# Patient Record
Sex: FEMALE | URBAN - METROPOLITAN AREA
[De-identification: names, ages, dates, MRNs, and addresses within clinical notes are randomized per-mention and may not be internally consistent; named-entity substitution may affect disease eponyms.]

---

## 2017-05-22 ENCOUNTER — APPOINTMENT (OUTPATIENT)
Dept: OTHER | Facility: HOSPITAL | Age: 22
End: 2017-05-22
Attending: EMERGENCY MEDICINE

## 2024-08-17 ENCOUNTER — HOSPITAL ENCOUNTER (EMERGENCY)
Facility: HOSPITAL | Age: 29
Discharge: HOME | End: 2024-08-17
Attending: EMERGENCY MEDICINE
Payer: COMMERCIAL

## 2024-08-17 ENCOUNTER — APPOINTMENT (OUTPATIENT)
Dept: GENERAL RADIOLOGY | Facility: HOSPITAL | Age: 29
End: 2024-08-17
Attending: EMERGENCY MEDICINE
Payer: COMMERCIAL

## 2024-08-17 VITALS
WEIGHT: 200 LBS | DIASTOLIC BLOOD PRESSURE: 83 MMHG | TEMPERATURE: 98 F | HEART RATE: 89 BPM | RESPIRATION RATE: 20 BRPM | OXYGEN SATURATION: 100 % | SYSTOLIC BLOOD PRESSURE: 134 MMHG

## 2024-08-17 DIAGNOSIS — T17.1XXA FOREIGN BODY IN NOSE, INITIAL ENCOUNTER: Primary | ICD-10-CM

## 2024-08-17 PROCEDURE — 99284 EMERGENCY DEPT VISIT MOD MDM: CPT

## 2024-08-17 PROCEDURE — 99283 EMERGENCY DEPT VISIT LOW MDM: CPT

## 2024-08-17 PROCEDURE — 70160 X-RAY EXAM OF NASAL BONES: CPT | Performed by: EMERGENCY MEDICINE

## 2024-08-17 NOTE — ED INITIAL ASSESSMENT (HPI)
Patient arrives to ER for evaluation of FB in nose. Patient states her nose ring came out in the middle of the night and the backing to the nose earring is stuck in her nose. Patient states she can feel it in her nose and tried getting it out unsuccessfully.

## 2024-08-17 NOTE — ED PROVIDER NOTES
Patient Seen in: [facility] Emergency Department      History     Chief Complaint   Patient presents with    FB in Nose     Stated Complaint:     Subjective:   HPI    Patient is a 29-year-old female who had a nose ring.  She states the external portion fell out and she is concerned that the backing is stuck in her nasal passage.  She states she intermittently feels a foreign body sensation.  No difficulty breathing    Objective:   History reviewed. No pertinent past medical history.           History reviewed. No pertinent surgical history.             Social History     Socioeconomic History    Marital status: Single   Tobacco Use    Smoking status: Never    Smokeless tobacco: Never   Substance and Sexual Activity    Alcohol use: Yes     Comment: socially    Drug use: Never     Social Determinants of Health     Financial Resource Strain: High Risk (7/5/2024)    Received from [facility]    Overall Financial Resource Strain (CARDIA)     Difficulty of Paying Living Expenses: Hard   Food Insecurity: Food Insecurity Present (7/5/2024)    Received from [facility]    Hunger Vital Sign     Worried About Running Out of Food in the Last Year: Often true     Ran Out of Food in the Last Year: Often true   Transportation Needs: No Transportation Needs (7/5/2024)    Received from [facility]    PRAPARE - Transportation     Lack of Transportation (Medical): No     Lack of Transportation (Non-Medical): No   Housing Stability: Low Risk  (7/5/2024)    Received from [facility]    Housing Stability Vital Sign     Unable to Pay for Housing in the Last Year: No     Number of Places Lived in the Last Year: 1     In the last 12 months, was there a time when you did not have a steady place to sleep or slept in a shelter (including now)?: No              Review of Systems    Positive for stated Chief Complaint: FB in Nose    Other systems are as  noted in HPI.  Constitutional and vital signs reviewed.      All other systems reviewed and negative except as noted above.    Physical Exam     ED Triage Vitals [08/17/24 0725]   /83   Pulse 89   Resp 20   Temp 98.3 °F (36.8 °C)   Temp src Temporal   SpO2 100 %   O2 Device None (Room air)       Current Vitals:   Vital Signs  BP: 134/83  Pulse: 89  Resp: 20  Temp: 98.3 °F (36.8 °C)  Temp src: Temporal    Oxygen Therapy  SpO2: 100 %  O2 Device: None (Room air)            Physical Exam    Constitutional: Oriented to person, place, and time. Appears well-developed and well-nourished.   HEENT:   Head: Normocephalic and atraumatic.   Right Ear: External ear normal.   Left Ear: External ear normal.   Nose: No visible or palpable foreign body at the site of the nose ring.  Inspection of the nasal cavity with otoscope shows no visible foreign body  Mouth/Throat: Oropharynx is clear and moist.   Eyes: Conjunctivae and EOM are normal. Pupils are equal, round, and reactive to light.   Neck: Neck supple.   Cardiovascular: Normal rate, regular rhythm, normal heart sounds and intact distal pulses.    Pulmonary/Chest: Effort normal and breath sounds normal. No respiratory distress.   Abdominal: Soft. Bowel sounds are normal. Exhibits no distension and no mass. There is no tenderness. There is no rebound and no guarding.   Musculoskeletal: Normal range of motion. Exhibits no edema or tenderness.   Lymphadenopathy: No cervical adenopathy.   Neurological: Alert and oriented to person, place, and time. Normal reflexes. No cranial nerve deficit. No motor os sensory defecits noted Coordination normal.   Skin: Skin is warm and dry.   Psychiatric: Normal mood and affect. Behavior is normal. Judgment and thought content normal.   Nursing note and vitals reviewed.        ED Course   Labs Reviewed - No data to display                   MDM      Use of independent historian:     I personally reviewed and interpreted the images : Foreign  body noted    No results found.    Vitals:    08/17/24 0725   BP: 134/83   Pulse: 89   Resp: 20   Temp: 98.3 °F (36.8 °C)   TempSrc: Temporal   SpO2: 100%   Weight: 90.7 kg     *I personally reviewed and interpreted all ED vitals.    Pulse Ox: 100%, Room air, Normal         Differential Diagnosis/ Diagnostic Considerations: Patient with concern of foreign body in her nose the backing of her earring.  Consider foreign body embedded in the skin consider sinus foreign body consider aspirated foreign body    Medical Record Review: I personally reviewed available prior medical records for any recent pertinent discharge summaries, testing, and procedures and reviewed those reports and found .    Complicating Factors: The patient already has  which contribute to the complexity of this ED evaluation.    Social determinants of health:    Prescription drug management:      Shared Decision Making:    ED Course: Patient states she was in x-ray and after the nasal bones were done she coughed and the foreign body expelled through her mouth.  She has it in her hand.    Discussion of management with other healthcare providers:    Condition upon leaving the department: Stable                                     Medical Decision Making      Disposition and Plan     Clinical Impression:  1. Foreign body in nose, initial encounter         Disposition:  Discharge  8/17/2024  8:48 am    Follow-up:  No follow-up provider specified.        Medications Prescribed:  There are no discharge medications for this patient.

## 2025-03-11 ENCOUNTER — OFFICE VISIT (OUTPATIENT)
Dept: FAMILY MEDICINE CLINIC | Facility: CLINIC | Age: 30
End: 2025-03-11

## 2025-03-11 ENCOUNTER — EKG ENCOUNTER (OUTPATIENT)
Dept: LAB | Age: 30
End: 2025-03-11
Attending: PHYSICIAN ASSISTANT
Payer: COMMERCIAL

## 2025-03-11 ENCOUNTER — LAB ENCOUNTER (OUTPATIENT)
Dept: LAB | Age: 30
End: 2025-03-11
Attending: PHYSICIAN ASSISTANT
Payer: COMMERCIAL

## 2025-03-11 ENCOUNTER — NURSE TRIAGE (OUTPATIENT)
Dept: FAMILY MEDICINE CLINIC | Facility: CLINIC | Age: 30
End: 2025-03-11

## 2025-03-11 VITALS
HEIGHT: 62.84 IN | SYSTOLIC BLOOD PRESSURE: 102 MMHG | DIASTOLIC BLOOD PRESSURE: 75 MMHG | HEART RATE: 92 BPM | WEIGHT: 193 LBS | BODY MASS INDEX: 34.2 KG/M2

## 2025-03-11 DIAGNOSIS — D64.9 ANEMIA, UNSPECIFIED TYPE: ICD-10-CM

## 2025-03-11 DIAGNOSIS — R42 DIZZINESS: ICD-10-CM

## 2025-03-11 DIAGNOSIS — R42 DIZZINESS: Primary | ICD-10-CM

## 2025-03-11 LAB
ALBUMIN SERPL-MCNC: 4.7 G/DL (ref 3.2–4.8)
ALBUMIN/GLOB SERPL: 1.4 {RATIO} (ref 1–2)
ALP LIVER SERPL-CCNC: 90 U/L
ALT SERPL-CCNC: 28 U/L
ANION GAP SERPL CALC-SCNC: 8 MMOL/L (ref 0–18)
AST SERPL-CCNC: 23 U/L (ref ?–34)
BASOPHILS # BLD AUTO: 0.1 X10(3) UL (ref 0–0.2)
BASOPHILS NFR BLD AUTO: 0.8 %
BILIRUB SERPL-MCNC: 0.3 MG/DL (ref 0.3–1.2)
BUN BLD-MCNC: 16 MG/DL (ref 9–23)
BUN/CREAT SERPL: 22.9 (ref 10–20)
CALCIUM BLD-MCNC: 9.2 MG/DL (ref 8.7–10.4)
CHLORIDE SERPL-SCNC: 102 MMOL/L (ref 98–112)
CO2 SERPL-SCNC: 26 MMOL/L (ref 21–32)
CREAT BLD-MCNC: 0.7 MG/DL
DEPRECATED RDW RBC AUTO: 50.4 FL (ref 35.1–46.3)
EGFRCR SERPLBLD CKD-EPI 2021: 120 ML/MIN/1.73M2 (ref 60–?)
EOSINOPHIL # BLD AUTO: 0.13 X10(3) UL (ref 0–0.7)
EOSINOPHIL NFR BLD AUTO: 1 %
ERYTHROCYTE [DISTWIDTH] IN BLOOD BY AUTOMATED COUNT: 17.6 % (ref 11–15)
FASTING STATUS PATIENT QL REPORTED: NO
GLOBULIN PLAS-MCNC: 3.3 G/DL (ref 2–3.5)
GLUCOSE BLD-MCNC: 97 MG/DL (ref 70–99)
HCT VFR BLD AUTO: 37.9 %
HGB BLD-MCNC: 11.8 G/DL
IMM GRANULOCYTES # BLD AUTO: 0.03 X10(3) UL (ref 0–1)
IMM GRANULOCYTES NFR BLD: 0.2 %
LYMPHOCYTES # BLD AUTO: 4.4 X10(3) UL (ref 1–4)
LYMPHOCYTES NFR BLD AUTO: 34.1 %
MCH RBC QN AUTO: 24.5 PG (ref 26–34)
MCHC RBC AUTO-ENTMCNC: 31.1 G/DL (ref 31–37)
MCV RBC AUTO: 78.6 FL
MONOCYTES # BLD AUTO: 1.13 X10(3) UL (ref 0.1–1)
MONOCYTES NFR BLD AUTO: 8.7 %
NEUTROPHILS # BLD AUTO: 7.13 X10 (3) UL (ref 1.5–7.7)
NEUTROPHILS # BLD AUTO: 7.13 X10(3) UL (ref 1.5–7.7)
NEUTROPHILS NFR BLD AUTO: 55.2 %
OSMOLALITY SERPL CALC.SUM OF ELEC: 283 MOSM/KG (ref 275–295)
PLATELET # BLD AUTO: 353 10(3)UL (ref 150–450)
POTASSIUM SERPL-SCNC: 4.2 MMOL/L (ref 3.5–5.1)
PROT SERPL-MCNC: 8 G/DL (ref 5.7–8.2)
RBC # BLD AUTO: 4.82 X10(6)UL
SODIUM SERPL-SCNC: 136 MMOL/L (ref 136–145)
TSI SER-ACNC: 1.38 UIU/ML (ref 0.55–4.78)
WBC # BLD AUTO: 12.9 X10(3) UL (ref 4–11)

## 2025-03-11 PROCEDURE — 93005 ELECTROCARDIOGRAM TRACING: CPT

## 2025-03-11 PROCEDURE — 84443 ASSAY THYROID STIM HORMONE: CPT

## 2025-03-11 PROCEDURE — 36415 COLL VENOUS BLD VENIPUNCTURE: CPT

## 2025-03-11 PROCEDURE — 82728 ASSAY OF FERRITIN: CPT

## 2025-03-11 PROCEDURE — 85025 COMPLETE CBC W/AUTO DIFF WBC: CPT

## 2025-03-11 PROCEDURE — 93010 ELECTROCARDIOGRAM REPORT: CPT | Performed by: INTERNAL MEDICINE

## 2025-03-11 PROCEDURE — 80053 COMPREHEN METABOLIC PANEL: CPT

## 2025-03-11 NOTE — TELEPHONE ENCOUNTER
Action Requested: Summary for Provider     []  Critical Lab, Recommendations Needed  [] Need Additional Advice  []   FYI    []   Need Orders  [] Need Medications Sent to Pharmacy  []  Other     SUMMARY: Per protocol advised : Office visit   Future Appointments   Date Time Provider Department Center   3/11/2025  4:00 PM El Wiseman PA-C [facility] [place]   4/24/2025  4:00 PM Weiler, Colleen M, DO [facility]     Reason for call: Acute  Onset: Data Unavailable    Patient calling ( name and date of birth of patient verified ) has appointment today     Reports went on a trip to [place] last week , had been feeling dizzy before she left for [place] , ws ok in [place]  but the day  before she came home the dizziness started again . Felt nauseated   (  returned from  [place] on 3/5/2025 )     Today she is slightly dizzy, but does not have arm tingling today   Last time arms tingled was 3/6/2025  may have been sleeping on  arms wrong   States she also is very anxious       appointment  today is appropriate     Care Advice    Patient/Caregiver understands and will follow care advice?: Yes, plans to follow advice           Dizziness-A-OH  Caira Wadsworth Mar 11, 2025 12:10 PM      Disposition and First Aid       GO TO OFFICE NOW:              Dizziness From Not Drinking Enough Liquids       DRINK FLUIDS:  * Drink several glasses of fruit juice, other clear fluids or water.  * This will improve hydration and blood glucose.  * If the weather is hot or you have a fever, make sure the fluids are cold.              Dizziness From Heat Exposure       LIE DOWN AND REST:  * Lie down with feet elevated for 1 hour.  * This will improve circulation and increase blood flow to the brain.                           Patient verbalizes understanding and agrees with plan.       Reason for Disposition   Spinning or tilting sensation (vertigo) present now    Protocols used: Dizziness-A-OH

## 2025-03-11 NOTE — PROGRESS NOTES
HPI:     HPI  A 29-year-old woman has been experiencing dizziness for the past week. The dizziness worsens when she is on the computer. The patient denies chest pain, SOB, N/V/C/D, fever, and abdominal pain. There are no other concerns today.      Medications:     Current Outpatient Medications   Medication Sig Dispense Refill    Ferrous Sulfate 325 (65 Fe) MG Oral Tab Take 1 tablet (325 mg total) by mouth daily with breakfast. With orange juice. 90 tablet 1       Allergies:   Allergies[1]    History:     Health Maintenance   Topic Date Due    Annual Physical  Never done    Pap Smear  Never done    COVID-19 Vaccine (1 - 2024-25 season) Never done    Influenza Vaccine (1) Never done    Annual Depression Screening  Never done    DTaP,Tdap,and Td Vaccines (3 - Td or Tdap) 06/09/2033    Pneumococcal Vaccine: Birth to 50yrs  Aged Out    Meningococcal B Vaccine  Aged Out       Patient's last menstrual period was 02/28/2025 (approximate).   Past Medical History:   History reviewed. No pertinent past medical history.    Past Surgical History:   History reviewed. No pertinent surgical history.    Family History:   History reviewed. No pertinent family history.    Social History:     Social History     Socioeconomic History    Marital status: Single     Spouse name: Not on file    Number of children: Not on file    Years of education: Not on file    Highest education level: Not on file   Occupational History    Not on file   Tobacco Use    Smoking status: Never    Smokeless tobacco: Never   Substance and Sexual Activity    Alcohol use: Yes     Comment: socially    Drug use: Never    Sexual activity: Not on file   Other Topics Concern    Not on file   Social History Narrative    Not on file     Social Drivers of Health     Food Insecurity: Food Insecurity Present (7/5/2024)    Received from [facility]    Hunger Vital Sign     Worried About Running Out of Food in the Last Year: Often true     Ran Out of  Food in the Last Year: Often true   Transportation Needs: No Transportation Needs (7/5/2024)    Received from [facility]    PRAPARE - Transportation     Lack of Transportation (Medical): No     Lack of Transportation (Non-Medical): No   Stress: Not on file   Housing Stability: Low Risk  (7/5/2024)    Received from [facility]    Housing Stability Vital Sign     Unable to Pay for Housing in the Last Year: No     Number of Places Lived in the Last Year: 1     Unstable Housing in the Last Year: No       Review of Systems:   Review of Systems   Neurological:  Positive for dizziness.        Vitals:    03/11/25 1601   BP: 102/75   Pulse: 92   Weight: 193 lb (87.5 kg)   Height: 5' 2.84\" (1.596 m)     Body mass index is 34.37 kg/m².    Physical Exam:   Physical Exam  Vitals reviewed.   Constitutional:       General: She is not in acute distress.     Appearance: She is well-developed.   HENT:      Right Ear: Tympanic membrane, ear canal and external ear normal. There is no impacted cerumen.      Left Ear: Tympanic membrane, ear canal and external ear normal. There is no impacted cerumen.      Nose: Nose normal.      Mouth/Throat:      Mouth: Mucous membranes are moist.      Pharynx: Oropharynx is clear. No oropharyngeal exudate or posterior oropharyngeal erythema.   Eyes:      General:         Right eye: No discharge.         Left eye: No discharge.      Conjunctiva/sclera: Conjunctivae normal.   Cardiovascular:      Rate and Rhythm: Normal rate and regular rhythm.      Heart sounds: Normal heart sounds, S1 normal and S2 normal. No murmur heard.  Pulmonary:      Effort: Pulmonary effort is normal.      Breath sounds: Normal breath sounds. No wheezing or rales.   Chest:      Chest wall: No tenderness.   Lymphadenopathy:      Cervical: No cervical adenopathy.   Skin:     Findings: No rash.   Neurological:      Mental Status: She is alert and oriented to person, place, and time.    Psychiatric:         Behavior: Behavior is cooperative.          Assessment and Plan::     Assessment & Plan  Dizziness    Orders:    CBC With Differential With Platelet; Future    Comp Metabolic Panel (14); Future    TSH W Reflex To Free T4; Future    EKG 12 Lead; Future       Discussed plan of care with pt and pt is in agreement.All questions answered. Pt to call with questions or concerns.         [1]   Allergies  Allergen Reactions    Codeine ANAPHYLAXIS

## 2025-03-12 LAB
ATRIAL RATE: 79 BPM
DEPRECATED HBV CORE AB SER IA-ACNC: 5 NG/ML
P AXIS: -21 DEGREES
P-R INTERVAL: 130 MS
Q-T INTERVAL: 356 MS
QRS DURATION: 78 MS
QTC CALCULATION (BEZET): 408 MS
R AXIS: 141 DEGREES
T AXIS: -17 DEGREES
VENTRICULAR RATE: 79 BPM

## 2025-03-13 ENCOUNTER — TELEPHONE (OUTPATIENT)
Dept: CASE MANAGEMENT | Age: 30
End: 2025-03-13

## 2025-03-13 ENCOUNTER — TELEPHONE (OUTPATIENT)
Dept: FAMILY MEDICINE CLINIC | Facility: CLINIC | Age: 30
End: 2025-03-13

## 2025-03-13 DIAGNOSIS — R94.31 ABNORMAL EKG: Primary | ICD-10-CM

## 2025-03-13 NOTE — TELEPHONE ENCOUNTER
Per patient, she called her insurance and gave her [facility] [place] Tel#[phone].  No appointment yet.  Patient need the referral before she can make the appointment.

## 2025-03-13 NOTE — TELEPHONE ENCOUNTER
Patient calling,verified name and date of birth.    The cardiologist she was referred to does not accept her insurance plan, referral is still pending authorization in Epic.  Advised patient to contact her insurance plan to get a list of in network cardiologists and call back with preferred provider's name.  Answered patient questions about hemoglobin, and 12 lead Ekg.   Patient verbalizes understanding and agrees to plan of care.

## 2025-03-13 NOTE — TELEPHONE ENCOUNTER
El Wiseman,     Patient called requesting referral to Dr. Angeles.    Pended referral please review diagnosis and sign off if you agree.    Thank you.  Sasha Gonzalez  [facility]

## 2025-03-14 NOTE — TELEPHONE ENCOUNTER
Called patient to inform of cardiology referral in chart.    Patient stated had appointment this morning & referral had gone through.

## 2025-03-19 ENCOUNTER — TELEPHONE (OUTPATIENT)
Age: 30
End: 2025-03-19

## 2025-03-24 ENCOUNTER — OFFICE VISIT (OUTPATIENT)
Dept: FAMILY MEDICINE CLINIC | Facility: CLINIC | Age: 30
End: 2025-03-24
Payer: COMMERCIAL

## 2025-03-24 VITALS
DIASTOLIC BLOOD PRESSURE: 82 MMHG | HEIGHT: 62.84 IN | WEIGHT: 193 LBS | BODY MASS INDEX: 34.2 KG/M2 | SYSTOLIC BLOOD PRESSURE: 126 MMHG | HEART RATE: 80 BPM

## 2025-03-24 DIAGNOSIS — J01.00 ACUTE NON-RECURRENT MAXILLARY SINUSITIS: Primary | ICD-10-CM

## 2025-03-24 DIAGNOSIS — R09.82 POST-NASAL DRIP: ICD-10-CM

## 2025-03-24 PROCEDURE — 3008F BODY MASS INDEX DOCD: CPT | Performed by: PHYSICIAN ASSISTANT

## 2025-03-24 PROCEDURE — 3074F SYST BP LT 130 MM HG: CPT | Performed by: PHYSICIAN ASSISTANT

## 2025-03-24 PROCEDURE — 3079F DIAST BP 80-89 MM HG: CPT | Performed by: PHYSICIAN ASSISTANT

## 2025-03-24 PROCEDURE — 99213 OFFICE O/P EST LOW 20 MIN: CPT | Performed by: PHYSICIAN ASSISTANT

## 2025-03-24 RX ORDER — AMOXICILLIN 875 MG/1
875 TABLET, COATED ORAL 2 TIMES DAILY
Qty: 20 TABLET | Refills: 0 | Status: SHIPPED | OUTPATIENT
Start: 2025-03-24 | End: 2025-04-03

## 2025-03-24 RX ORDER — FLUTICASONE PROPIONATE 50 MCG
2 SPRAY, SUSPENSION (ML) NASAL DAILY
Qty: 16 G | Refills: 2 | Status: SHIPPED | OUTPATIENT
Start: 2025-03-24 | End: 2025-04-23

## 2025-03-24 NOTE — PROGRESS NOTES
HPI:     HPI  A 29-year-old woman has presented with sinus pressure and mucus for the past few weeks. She denies headache, N/V, nasal congestion, cough, sore throat, or difficulty breathing.    Medications:     Current Outpatient Medications   Medication Sig Dispense Refill    fluticasone propionate 50 MCG/ACT Nasal Suspension 2 sprays by Each Nare route daily for 30 doses. 16 g 2    amoxicillin 875 MG Oral Tab Take 1 tablet (875 mg total) by mouth 2 (two) times daily for 10 days. 20 tablet 0    Ferrous Sulfate 325 (65 Fe) MG Oral Tab Take 1 tablet (325 mg total) by mouth daily with breakfast. With orange juice. (Patient not taking: Reported on 3/24/2025) 90 tablet 1       Allergies:   Allergies[1]    History:     Health Maintenance   Topic Date Due    Annual Physical  Never done    Pap Smear  Never done    COVID-19 Vaccine (1 - 2024-25 season) Never done    Influenza Vaccine (1) Never done    Annual Depression Screening  Never done    DTaP,Tdap,and Td Vaccines (3 - Td or Tdap) 06/09/2033    Pneumococcal Vaccine: Birth to 50yrs  Aged Out    Meningococcal B Vaccine  Aged Out       Patient's last menstrual period was 03/20/2025 (approximate).   Past Medical History:   History reviewed. No pertinent past medical history.    Past Surgical History:   History reviewed. No pertinent surgical history.    Family History:   History reviewed. No pertinent family history.    Social History:     Social History     Socioeconomic History    Marital status: Single     Spouse name: Not on file    Number of children: Not on file    Years of education: Not on file    Highest education level: Not on file   Occupational History    Not on file   Tobacco Use    Smoking status: Never    Smokeless tobacco: Never   Vaping Use    Vaping status: Never Used   Substance and Sexual Activity    Alcohol use: Yes     Comment: socially    Drug use: Never    Sexual activity: Not on file   Other Topics Concern    Not on file   Social History Narrative     Not on file     Social Drivers of Health     Food Insecurity: Food Insecurity Present (7/5/2024)    Received from [facility]    Hunger Vital Sign     Worried About Running Out of Food in the Last Year: Often true     Ran Out of Food in the Last Year: Often true   Transportation Needs: No Transportation Needs (7/5/2024)    Received from [facility]    PRAPARE - Transportation     Lack of Transportation (Medical): No     Lack of Transportation (Non-Medical): No   Stress: Not on file   Housing Stability: Low Risk  (7/5/2024)    Received from [facility]    Housing Stability Vital Sign     Unable to Pay for Housing in the Last Year: No     Number of Places Lived in the Last Year: 1     Unstable Housing in the Last Year: No       Review of Systems:   Review of Systems   HENT:  Positive for postnasal drip, sinus pressure and sinus pain.         Vitals:    03/24/25 1428 03/24/25 1446   BP: 145/86 126/82   Pulse: 80    Weight: 193 lb (87.5 kg)    Height: 5' 2.84\" (1.596 m)      Body mass index is 34.36 kg/m².    Physical Exam:   Physical Exam  Vitals reviewed.   Constitutional:       General: She is not in acute distress.     Appearance: She is well-developed.   HENT:      Right Ear: Tympanic membrane, ear canal and external ear normal. There is no impacted cerumen.      Left Ear: Tympanic membrane, ear canal and external ear normal. There is no impacted cerumen.      Nose: Congestion present.      Right Sinus: Maxillary sinus tenderness present. No frontal sinus tenderness.      Left Sinus: Maxillary sinus tenderness present. No frontal sinus tenderness.      Mouth/Throat:      Mouth: Mucous membranes are moist.      Pharynx: Oropharynx is clear. No oropharyngeal exudate or posterior oropharyngeal erythema.   Eyes:      General:         Right eye: No discharge.         Left eye: No discharge.      Conjunctiva/sclera: Conjunctivae normal.   Cardiovascular:       Rate and Rhythm: Normal rate and regular rhythm.      Heart sounds: Normal heart sounds, S1 normal and S2 normal. No murmur heard.  Pulmonary:      Effort: Pulmonary effort is normal.      Breath sounds: Normal breath sounds. No wheezing or rales.   Chest:      Chest wall: No tenderness.   Lymphadenopathy:      Cervical: No cervical adenopathy.   Skin:     Findings: No rash.   Neurological:      Mental Status: She is alert and oriented to person, place, and time.   Psychiatric:         Behavior: Behavior is cooperative.          Assessment and Plan::     Assessment & Plan  Acute non-recurrent maxillary sinusitis    Orders:    amoxicillin 875 MG Oral Tab; Take 1 tablet (875 mg total) by mouth 2 (two) times daily for 10 days.    Post-nasal drip    Orders:    fluticasone propionate 50 MCG/ACT Nasal Suspension; 2 sprays by Each Nare route daily for 30 doses.       Discussed plan of care with pt and pt is in agreement.All questions answered. Pt to call with questions or concerns.         [1]   Allergies  Allergen Reactions    Codeine ANAPHYLAXIS

## 2025-03-25 ENCOUNTER — TELEPHONE (OUTPATIENT)
Dept: FAMILY MEDICINE CLINIC | Facility: CLINIC | Age: 30
End: 2025-03-25

## 2025-03-25 ENCOUNTER — OFFICE VISIT (OUTPATIENT)
Dept: FAMILY MEDICINE CLINIC | Facility: CLINIC | Age: 30
End: 2025-03-25
Payer: COMMERCIAL

## 2025-03-25 VITALS
WEIGHT: 192 LBS | HEART RATE: 80 BPM | HEIGHT: 62 IN | BODY MASS INDEX: 35.33 KG/M2 | DIASTOLIC BLOOD PRESSURE: 75 MMHG | SYSTOLIC BLOOD PRESSURE: 123 MMHG

## 2025-03-25 DIAGNOSIS — R09.82 POST-NASAL DRIP: Primary | ICD-10-CM

## 2025-03-25 DIAGNOSIS — R09.82 POST-NASAL DRIP: ICD-10-CM

## 2025-03-25 PROCEDURE — 3008F BODY MASS INDEX DOCD: CPT | Performed by: PHYSICIAN ASSISTANT

## 2025-03-25 PROCEDURE — 3078F DIAST BP <80 MM HG: CPT | Performed by: PHYSICIAN ASSISTANT

## 2025-03-25 PROCEDURE — 99212 OFFICE O/P EST SF 10 MIN: CPT | Performed by: PHYSICIAN ASSISTANT

## 2025-03-25 PROCEDURE — 3074F SYST BP LT 130 MM HG: CPT | Performed by: PHYSICIAN ASSISTANT

## 2025-03-25 RX ORDER — FLUTICASONE PROPIONATE 50 MCG
2 SPRAY, SUSPENSION (ML) NASAL DAILY
Qty: 16 G | Refills: 2 | Status: SHIPPED | OUTPATIENT
Start: 2025-03-25 | End: 2025-04-24

## 2025-03-25 NOTE — TELEPHONE ENCOUNTER
Pt was seen yesterday, prescribed Amoxicillin, had a reaction with the first dose around 5 PM - 2 hrs later started having symptoms  , heart racing, felt hot ,feel a string of  bumps in top of mouth  -draining white pus , feels irritated,gums feels sore      RX added to Allergy list      Please advise     Still have mucous in back of throat   2) was advised by pharmacy did not receive Nasal spray - RX resent

## 2025-03-25 NOTE — PROGRESS NOTES
HPI:     HPI  A 29-year-old woman is in the office for a follow-up. She reports experiencing palpitation after taking the first dose of Amoxicillin. She also has a bump on top of her mouth, which she burst with white pus. Her gum feels sore.  She denies rash, facial swelling, lip swelling, or SOB.    Medications:     Current Outpatient Medications   Medication Sig Dispense Refill    fluticasone propionate 50 MCG/ACT Nasal Suspension 2 sprays by Each Nare route daily for 30 doses. 16 g 2    Ferrous Sulfate 325 (65 Fe) MG Oral Tab Take 1 tablet (325 mg total) by mouth daily with breakfast. With orange juice. (Patient not taking: Reported on 3/24/2025) 90 tablet 1       Allergies:   Allergies[1]    History:     Health Maintenance   Topic Date Due    Annual Physical  Never done    Pap Smear  Never done    COVID-19 Vaccine (1 - 2024-25 season) Never done    Influenza Vaccine (1) Never done    Annual Depression Screening  Never done    DTaP,Tdap,and Td Vaccines (3 - Td or Tdap) 06/09/2033    Pneumococcal Vaccine: Birth to 50yrs  Aged Out    Meningococcal B Vaccine  Aged Out       Patient's last menstrual period was 03/20/2025 (approximate).   Past Medical History:   No past medical history on file.    Past Surgical History:   No past surgical history on file.    Family History:   No family history on file.    Social History:     Social History     Socioeconomic History    Marital status: Single     Spouse name: Not on file    Number of children: Not on file    Years of education: Not on file    Highest education level: Not on file   Occupational History    Not on file   Tobacco Use    Smoking status: Never    Smokeless tobacco: Never   Vaping Use    Vaping status: Never Used   Substance and Sexual Activity    Alcohol use: Yes     Comment: socially    Drug use: Never    Sexual activity: Not on file   Other Topics Concern    Not on file   Social History Narrative    Not on file     Social Drivers of Health     Food  Insecurity: Food Insecurity Present (7/5/2024)    Received from [facility]    Hunger Vital Sign     Worried About Running Out of Food in the Last Year: Often true     Ran Out of Food in the Last Year: Often true   Transportation Needs: No Transportation Needs (7/5/2024)    Received from [facility]    PRAPARE - Transportation     Lack of Transportation (Medical): No     Lack of Transportation (Non-Medical): No   Stress: Not on file   Housing Stability: Low Risk  (7/5/2024)    Received from [facility]    Housing Stability Vital Sign     Unable to Pay for Housing in the Last Year: No     Number of Places Lived in the Last Year: 1     Unstable Housing in the Last Year: No       Review of Systems:   Review of Systems   Cardiovascular:  Positive for palpitations.        Vitals:    03/25/25 1720   BP: 123/75   Pulse: 80   Weight: 192 lb (87.1 kg)   Height: 5' 2\" (1.575 m)     Body mass index is 35.12 kg/m².    Physical Exam:   Physical Exam  Vitals reviewed.   Constitutional:       General: She is not in acute distress.     Appearance: She is well-developed.   HENT:      Right Ear: Tympanic membrane, ear canal and external ear normal. There is no impacted cerumen.      Left Ear: Tympanic membrane, ear canal and external ear normal. There is no impacted cerumen.      Nose: Nose normal.      Mouth/Throat:      Mouth: Mucous membranes are moist.      Pharynx: Oropharynx is clear. No oropharyngeal exudate or posterior oropharyngeal erythema.   Eyes:      General:         Right eye: No discharge.         Left eye: No discharge.      Conjunctiva/sclera: Conjunctivae normal.   Cardiovascular:      Rate and Rhythm: Normal rate and regular rhythm.      Heart sounds: Normal heart sounds, S1 normal and S2 normal. No murmur heard.  Pulmonary:      Effort: Pulmonary effort is normal.      Breath sounds: Normal breath sounds. No wheezing or rales.   Chest:      Chest  wall: No tenderness.   Lymphadenopathy:      Cervical: No cervical adenopathy.   Skin:     Findings: No rash.   Neurological:      Mental Status: She is alert and oriented to person, place, and time.   Psychiatric:         Behavior: Behavior is cooperative.          Assessment and Plan::     Assessment & Plan  Post-nasal drip       - Continue with Flonase nasal spray.  - Discontinue Amoxicillin.  - Reassured the patient.  - Advise the patient to gargle with warm salt water.     Discussed plan of care with pt and pt is in agreement.All questions answered. Pt to call with questions or concerns.         [1]   Allergies  Allergen Reactions    Amoxicillin FACE FLUSHING, ARRHYTHMIA and ANGIOEDEMA    Codeine ANAPHYLAXIS

## 2025-03-25 NOTE — TELEPHONE ENCOUNTER
Spoke to patient and agreed to be seen today.   Will be coming in at 5:15PM.   Full name and  verified.

## 2025-03-27 ENCOUNTER — TELEPHONE (OUTPATIENT)
Dept: CASE MANAGEMENT | Age: 30
End: 2025-03-27

## 2025-03-27 DIAGNOSIS — R94.31 ABNORMAL EKG: Primary | ICD-10-CM

## 2025-03-27 NOTE — TELEPHONE ENCOUNTER
El Wiseman,    Patient called requesting referral to Dr. Sanchez for appointment 4/2/25.     Pended referral please review diagnosis and sign off if you agree.    Thank you.  Sasha Gonzalez  [facility]

## 2025-04-24 ENCOUNTER — OFFICE VISIT (OUTPATIENT)
Dept: FAMILY MEDICINE CLINIC | Facility: CLINIC | Age: 30
End: 2025-04-24
Payer: COMMERCIAL

## 2025-04-24 VITALS
HEART RATE: 86 BPM | BODY MASS INDEX: 35.16 KG/M2 | HEIGHT: 61.42 IN | DIASTOLIC BLOOD PRESSURE: 81 MMHG | RESPIRATION RATE: 16 BRPM | SYSTOLIC BLOOD PRESSURE: 117 MMHG | WEIGHT: 188.63 LBS | TEMPERATURE: 98 F

## 2025-04-24 DIAGNOSIS — D50.9 IRON DEFICIENCY ANEMIA, UNSPECIFIED IRON DEFICIENCY ANEMIA TYPE: ICD-10-CM

## 2025-04-24 DIAGNOSIS — R07.81 RIB PAIN ON LEFT SIDE: ICD-10-CM

## 2025-04-24 DIAGNOSIS — Z00.00 ROUTINE PHYSICAL EXAMINATION: Primary | ICD-10-CM

## 2025-04-24 DIAGNOSIS — M25.512 ACUTE PAIN OF LEFT SHOULDER: ICD-10-CM

## 2025-04-24 DIAGNOSIS — Z31.69 PRE-CONCEPTION COUNSELING: ICD-10-CM

## 2025-04-24 DIAGNOSIS — R09.81 NASAL CONGESTION: ICD-10-CM

## 2025-04-24 DIAGNOSIS — R06.83 SNORING: ICD-10-CM

## 2025-04-24 PROCEDURE — 3079F DIAST BP 80-89 MM HG: CPT | Performed by: FAMILY MEDICINE

## 2025-04-24 PROCEDURE — 3074F SYST BP LT 130 MM HG: CPT | Performed by: FAMILY MEDICINE

## 2025-04-24 PROCEDURE — 99385 PREV VISIT NEW AGE 18-39: CPT | Performed by: FAMILY MEDICINE

## 2025-04-24 PROCEDURE — 3008F BODY MASS INDEX DOCD: CPT | Performed by: FAMILY MEDICINE

## 2025-04-24 NOTE — PROGRESS NOTES
HPI:  29 yr old female who presents for physical. Single. No children. Just got laid off. Did . Exercises regularly.  Eats healthy. Increased stress lately.  Sleep is difficult.     Just started getting regular periods in February.  ?PCOS. HAs been with partner for 10 years and has not gotten pregnant.     Feels like left side of the body gets colder and falls asleep faster.      Has pain in the left lower ribcage and around flank. Has specific place in the back which hurts.     Sees occasional floaters.  Saw eye doctor who states she had normal exam.    Wakes at night with nasal congestion.  Uses nose strip which helps some. Boyfriend tells her she snores. She states she wakes frequently at night.      PMHx: reviewed, see chart  PSHx: reviewed, see chart  All: Amoxicillin and Codeine   Meds: see chart    ROS:   Allergic/Immuno:  Negative for environmental allergies and food allergies  Cardiovascular:  Negative for chest pain and irregular heartbeat/palpitations  Constitutional:  Negative for decreased activity, fever, irritability and lethargy  Endocrine:  Negative for abnormal sleep patterns, increased activity, polydipsia and polyphagia  ENMT:  Negative for ear drainage, hearing loss and nasal drainage  Eyes:  Negative for eye discharge and vision loss  Gastrointestinal:  Negative for abdominal pain, constipation, decreased appetite, diarrhea and vomiting  Genitourinary:  Negative for dysuria and hematuria  Hema/Lymph:  Negative for easy bleeding and easy bruising  Integumentary:  Negative for pruritus and rash  Musculoskeletal:  Negative for bone/joint symptoms  Neurological:  Negative for gait disturbance  Psychiatric:  Negative for inappropriate interaction and psychiatric symptoms    PE:  /81   Pulse 86   Temp 98 °F (36.7 °C) (Temporal)   Resp 16   Ht 5' 1.42\" (1.56 m)   Wt 188 lb 9.6 oz (85.5 kg)   LMP 04/20/2025 (Approximate)   BMI 35.15 kg/m²   Gen:  Well-nourished.  No  distress.  HEENT: Conjunctive clear.  Samuel ear canals clear.  Samuel TMs intact with good landmarks noted.  Nares patent.  Oral mucous membrane moist.  Normal lips, teeth, and gums.  Oropharynx normal.  Neck supple.  Good ROM.  No LAD.  Thyroid normal.  CV:  Regular rate and rhythm; no murmurs  Lungs:  Clear to ausculation; good aeration               No wheezes, rales or rhonchi  Abd: soft, non-tender, non-distended          Normal bowel sounds; no masses          No hepatosplenomegaly    Extremities: No cyanosis, clubbing, edema.  Pedal pulses 2+ samuel.  MSK:  No abnormalities.  Skin: Warm/dry/intact.  No abnormal moles/lesions noted.  No rashes.    A/P:  Encounter Diagnoses   Name Primary?    Routine physical examination    Encouraged healthy diet and exercise.  Labs done   Yes    Iron deficiency anemia, unspecified iron deficiency anemia type    Check blood counts and iron studies       Pre-conception counseling    Refer to Gyne to discuss conception and do Gyne exam.        Snoring    May be due to nasal congestion.  Will monitor.        Nasal congestion    Using strips.  Consider ENT eval       Acute pain of left shoulder     Rib pain on left side    Refer for physical therapy.       Colleen Weiler, DO

## 2025-06-13 ENCOUNTER — OFFICE VISIT (OUTPATIENT)
Dept: OTOLARYNGOLOGY | Facility: CLINIC | Age: 30
End: 2025-06-13

## 2025-06-13 ENCOUNTER — NURSE TRIAGE (OUTPATIENT)
Dept: FAMILY MEDICINE CLINIC | Facility: CLINIC | Age: 30
End: 2025-06-13

## 2025-06-13 DIAGNOSIS — R09.82 POSTNASAL DRIP: ICD-10-CM

## 2025-06-13 DIAGNOSIS — R09.89 CHRONIC THROAT CLEARING: Primary | ICD-10-CM

## 2025-06-13 DIAGNOSIS — J34.2 NASAL SEPTAL DEVIATION: ICD-10-CM

## 2025-06-13 DIAGNOSIS — J34.3 NASAL TURBINATE HYPERTROPHY: ICD-10-CM

## 2025-06-13 PROCEDURE — 99203 OFFICE O/P NEW LOW 30 MIN: CPT | Performed by: SPECIALIST

## 2025-06-13 RX ORDER — LEVOCETIRIZINE DIHYDROCHLORIDE 5 MG/1
5 TABLET, FILM COATED ORAL EVERY EVENING
Qty: 30 TABLET | Refills: 0 | Status: SHIPPED | OUTPATIENT
Start: 2025-06-13

## 2025-06-13 RX ORDER — FAMOTIDINE 40 MG/1
40 TABLET, FILM COATED ORAL DAILY
Qty: 30 TABLET | Refills: 3 | Status: SHIPPED | OUTPATIENT
Start: 2025-06-13

## 2025-06-13 NOTE — TELEPHONE ENCOUNTER
Action Requested: Summary for Provider     []  Critical Lab, Recommendations Needed  [] Need Additional Advice  []   FYI    []   Need Orders  [] Need Medications Sent to Pharmacy  []  Other     SUMMARY: Per protocol advised :  Office visit is appropriate   Future Appointments   Date Time Provider Department Center   6/16/2025  8:45 AM [facility]   6/16/2025 11:15 AM Makayla Jackson DO [facility]   7/25/2025  8:20 AM Pura Morales MD [facility]       Reason for call: Dizziness  Onset: Data Unavailable   Call transferred from \[facility]\"" due to My Chart appt   Patient calling ( name and date of birth of patient verified )  has appointment for Monday  due to dizziness       Reports when she sitting just watching TV her \" brain \" is spinning,  the sensation is like when you exit a spinning ride,  having some intermittent balance  issues especaly when exercising     Care Advice    Patient/Caregiver understands and will follow care advice?: Yes, plans to follow advice           Dizziness-A-OH  Ciara B Fri Jun 13, 2025 09:41 AM      Disposition and First Aid       SEE IN OFFICE WITHIN 3 DAYS:   * You need to be examined.   * Let me give you an appointment.              Dizziness From Not Drinking Enough Liquids       DRINK FLUIDS:  * Drink several glasses of fruit juice, other clear fluids or water.  * This will improve hydration and blood glucose.  * If the weather is hot or you have a fever, make sure the fluids are cold.    PREVENTION - DIZZINESS:  * Drink extra water and eat some salty foods during exercise or hot weather.  * Avoid over-heating during hot weather.  * Eat healthy. Try to have regular mealtimes.  * Get adequate sleep and rest.              Dizziness From Standing       SIT UP SLOWLY BEFORE STANDING:  * In the mornings, sit up for a few minutes before you stand up. That will help your circulation make the adjustment.  * If you have to stand up for a long  period, contract and relax your leg muscles to help pump the blood back to the heart.  * Sit down or lie down if you feel dizzy.                           Patient verbalizes understanding and agrees with treatment  plan.       Reason for Disposition   MILD dizziness (e.g., walking normally) and has NOT been evaluated by physician for this (Exception: Dizziness caused by heat exposure, sudden standing, or poor fluid intake.)    Protocols used: Dizziness-A-OH

## 2025-06-14 NOTE — PROGRESS NOTES
Maryuri Valdes is a 29 year old female.   Chief Complaint   Patient presents with    Nose Problem     Left nasal difficulty breathing X 1 year      HPI:   Patient here feels like she is having nasal obstruction worse on the left.  Also has chronic throat clearing and dripping.    Current Medications[1]   Past Medical History[2]   Social History:  Short Social Hx on File[3]     REVIEW OF SYSTEMS:   GENERAL HEALTH: feels well otherwise  GENERAL : denies fever, chills, sweats, weight loss, weight gain  SKIN: denies any unusual skin lesions or rashes  RESPIRATORY: denies shortness of breath with exertion  NEURO: denies headaches    EXAM:   LMP 04/20/2025 (Approximate)   System Details   Skin Inspection - Normal.   Constitutional Overall appearance - Normal.   Head/Face Facial features - Normal. Eyebrows - Normal. Skull - Normal.   Eyes Conjunctiva - Right: Normal, Left: Normal. Pupil - Right: Normal, Left: Normal.    Ears Inspection - Right: Normal, Left: Normal.   Canal - Right: Normal, Left: Normal.   TM - Right: Normal, Left: Normal.   Nasal External nose - Normal.   Nasal septum -left septal deviation  Turbinates -turbinate congestion, no purulence or polyps by speculum exam   Oral/Oropharynx Lips - Normal, Tonsils - Normal, Tongue - Normal    Neck Exam Inspection - Normal. Palpation - Normal. Parotid gland - Normal. Thyroid gland - Normal.   Lymph Detail Submental. Submandibular. Anterior cervical. Posterior cervical. Supraclavicular all without enlargement   Larynx Mirror examination with visualization of the posterior larynx which showed some erythema.  Normal vocal cord mobility.  Cannot see anterior larynx well this way.   Psychiatric Orientation - Oriented to time, place, person & situation. Appropriate mood and affect.   Neurological Memory - Normal. Cranial nerves - Cranial nerves II through XII grossly intact.     ASSESSMENT AND PLAN:   1. Chronic throat clearing  Patient placed on a trial of  famotidine and antireflux diet.  Will recommend fiberoptic scope on return if symptoms are persistent.    2. Nasal turbinate hypertrophy  Placed on a trial of levocetirizine    3. Postnasal drip  Based on a trial of levocetirizine    4. Nasal septal deviation  To the left.  Can con consider a septoplasty if symptoms are persistent after maximal medical management.  Patient to follow-up in 6 weeks time, sooner if problems.      The patient indicates understanding of these issues and agrees to the plan.      Pura Morales MD  6/13/2025  7:41 PM       [1]   Current Outpatient Medications   Medication Sig Dispense Refill    levocetirizine 5 MG Oral Tab Take 1 tablet (5 mg total) by mouth every evening. 30 tablet 0    famotidine 40 MG Oral Tab Take 1 tablet (40 mg total) by mouth daily. 30 tablet 3    Ferrous Sulfate 325 (65 Fe) MG Oral Tab Take 1 tablet (325 mg total) by mouth daily with breakfast. With orange juice. (Patient not taking: Reported on 6/13/2025) 90 tablet 1   [2]   Past Medical History:   Anxiety   [3]   Social History  Socioeconomic History    Marital status: Single   Tobacco Use    Smoking status: Never     Passive exposure: Never    Smokeless tobacco: Never   Vaping Use    Vaping status: Never Used   Substance and Sexual Activity    Alcohol use: Yes     Comment: socially    Drug use: Never     Social Drivers of Health     Food Insecurity: No Food Insecurity (4/24/2025)    NCSS - Food Insecurity     Worried About Running Out of Food in the Last Year: No     Ran Out of Food in the Last Year: No   Transportation Needs: No Transportation Needs (4/24/2025)    NCSS - Transportation     Lack of Transportation: No   Housing Stability: Not At Risk (4/24/2025)    NCSS - Housing/Utilities     Has Housing: Yes     Worried About Losing Housing: No     Unable to Get Utilities: No

## 2025-06-14 NOTE — PATIENT INSTRUCTIONS
I placed you on a trial of famotidine and gave you an antireflux diet for possible reflux disease causing your throat clearing.  No greasy foods, spicy foods, chocolate, caffeine, alcohol, spearmint, peppermint, lemon, lime, orange, grapefruit, tomatoes.  Don't eat 2 hours before bedtime  Elevate the head of bed   I also placed you on Xyzal for your postnasal drip.  Follow-up in 6 weeks time, sooner if problems.

## 2025-06-16 ENCOUNTER — OFFICE VISIT (OUTPATIENT)
Dept: FAMILY MEDICINE CLINIC | Facility: CLINIC | Age: 30
End: 2025-06-16
Payer: MEDICAID

## 2025-06-16 ENCOUNTER — OFFICE VISIT (OUTPATIENT)
Dept: OBGYN CLINIC | Facility: CLINIC | Age: 30
End: 2025-06-16
Payer: MEDICAID

## 2025-06-16 ENCOUNTER — LAB ENCOUNTER (OUTPATIENT)
Dept: LAB | Age: 30
End: 2025-06-16
Attending: FAMILY MEDICINE
Payer: MEDICAID

## 2025-06-16 VITALS
DIASTOLIC BLOOD PRESSURE: 85 MMHG | TEMPERATURE: 98 F | SYSTOLIC BLOOD PRESSURE: 127 MMHG | WEIGHT: 189.81 LBS | HEART RATE: 98 BPM | RESPIRATION RATE: 16 BRPM | BODY MASS INDEX: 35 KG/M2

## 2025-06-16 VITALS
HEIGHT: 61.42 IN | DIASTOLIC BLOOD PRESSURE: 64 MMHG | WEIGHT: 189 LBS | SYSTOLIC BLOOD PRESSURE: 118 MMHG | BODY MASS INDEX: 35.23 KG/M2

## 2025-06-16 DIAGNOSIS — Z12.4 PAP SMEAR FOR CERVICAL CANCER SCREENING: ICD-10-CM

## 2025-06-16 DIAGNOSIS — F41.9 ANXIETY: ICD-10-CM

## 2025-06-16 DIAGNOSIS — R32 URINARY INCONTINENCE, UNSPECIFIED TYPE: ICD-10-CM

## 2025-06-16 DIAGNOSIS — E28.2 PCOS (POLYCYSTIC OVARIAN SYNDROME): ICD-10-CM

## 2025-06-16 DIAGNOSIS — R42 DIZZINESS: Primary | ICD-10-CM

## 2025-06-16 DIAGNOSIS — Z01.419 VISIT FOR GYNECOLOGIC EXAMINATION: Primary | ICD-10-CM

## 2025-06-16 DIAGNOSIS — Z31.89 ENCOUNTER FOR FERTILITY PLANNING: ICD-10-CM

## 2025-06-16 DIAGNOSIS — D50.9 IRON DEFICIENCY ANEMIA, UNSPECIFIED IRON DEFICIENCY ANEMIA TYPE: ICD-10-CM

## 2025-06-16 DIAGNOSIS — R42 DIZZINESS: ICD-10-CM

## 2025-06-16 LAB
ANION GAP SERPL CALC-SCNC: 7 MMOL/L (ref 0–18)
BUN BLD-MCNC: 14 MG/DL (ref 9–23)
BUN/CREAT SERPL: 21.9 (ref 10–20)
CALCIUM BLD-MCNC: 9.3 MG/DL (ref 8.7–10.4)
CHLORIDE SERPL-SCNC: 106 MMOL/L (ref 98–112)
CO2 SERPL-SCNC: 28 MMOL/L (ref 21–32)
CREAT BLD-MCNC: 0.64 MG/DL (ref 0.55–1.02)
DEPRECATED HBV CORE AB SER IA-ACNC: 20 NG/ML (ref 50–306)
DEPRECATED RDW RBC AUTO: 52.8 FL (ref 35.1–46.3)
EGFRCR SERPLBLD CKD-EPI 2021: 123 ML/MIN/1.73M2 (ref 60–?)
ERYTHROCYTE [DISTWIDTH] IN BLOOD BY AUTOMATED COUNT: 16.7 % (ref 11–15)
EST. AVERAGE GLUCOSE BLD GHB EST-MCNC: 117 MG/DL (ref 68–126)
FASTING STATUS PATIENT QL REPORTED: YES
GLUCOSE BLD-MCNC: 99 MG/DL (ref 70–99)
HBA1C MFR BLD: 5.7 % (ref ?–5.7)
HCT VFR BLD AUTO: 41 % (ref 35–48)
HGB BLD-MCNC: 13.2 G/DL (ref 12–16)
IRON SATN MFR SERPL: 15 % (ref 15–50)
IRON SERPL-MCNC: 56 UG/DL (ref 50–170)
MCH RBC QN AUTO: 27.7 PG (ref 26–34)
MCHC RBC AUTO-ENTMCNC: 32.2 G/DL (ref 31–37)
MCV RBC AUTO: 86.1 FL (ref 80–100)
OSMOLALITY SERPL CALC.SUM OF ELEC: 293 MOSM/KG (ref 275–295)
PLATELET # BLD AUTO: 332 10(3)UL (ref 150–450)
POTASSIUM SERPL-SCNC: 4 MMOL/L (ref 3.5–5.1)
RBC # BLD AUTO: 4.76 X10(6)UL (ref 3.8–5.3)
SODIUM SERPL-SCNC: 141 MMOL/L (ref 136–145)
TOTAL IRON BINDING CAPACITY: 386 UG/DL (ref 250–425)
TRANSFERRIN SERPL-MCNC: 298 MG/DL (ref 250–380)
TSI SER-ACNC: 1.34 UIU/ML (ref 0.55–4.78)
WBC # BLD AUTO: 10.6 X10(3) UL (ref 4–11)

## 2025-06-16 PROCEDURE — 88175 CYTOPATH C/V AUTO FLUID REDO: CPT | Performed by: OBSTETRICS & GYNECOLOGY

## 2025-06-16 PROCEDURE — 84443 ASSAY THYROID STIM HORMONE: CPT

## 2025-06-16 PROCEDURE — 84466 ASSAY OF TRANSFERRIN: CPT

## 2025-06-16 PROCEDURE — 82728 ASSAY OF FERRITIN: CPT

## 2025-06-16 PROCEDURE — 99214 OFFICE O/P EST MOD 30 MIN: CPT | Performed by: FAMILY MEDICINE

## 2025-06-16 PROCEDURE — 85027 COMPLETE CBC AUTOMATED: CPT

## 2025-06-16 PROCEDURE — 83036 HEMOGLOBIN GLYCOSYLATED A1C: CPT

## 2025-06-16 PROCEDURE — 83540 ASSAY OF IRON: CPT

## 2025-06-16 PROCEDURE — 36415 COLL VENOUS BLD VENIPUNCTURE: CPT

## 2025-06-16 PROCEDURE — 80048 BASIC METABOLIC PNL TOTAL CA: CPT

## 2025-06-16 RX ORDER — LETROZOLE 2.5 MG/1
2.5 TABLET, FILM COATED ORAL DAILY
Qty: 5 TABLET | Refills: 1 | Status: SHIPPED | OUTPATIENT
Start: 2025-06-16

## 2025-06-16 NOTE — PROGRESS NOTES
Subjective:   Maryuri Valdes is a 29 year old female who presents for Dizziness (Started about a week ago, today eye started twitching and having floaters)       History/Other:   History of Present Illness  Maryuri Valdes is a 29 year old female who presents with dizziness and visual disturbances.    She has been experiencing dizziness for the past week, described as a sensation similar to getting off a roller coaster. These episodes occur randomly, last for a few seconds, and are not associated with specific head movements or sleep disturbances. Despite increasing her water and food intake, the symptoms persist, occurring multiple times a day.    She also experiences visual disturbances, including floaters, little black spots, and flashing lights throughout the day, which are not specifically linked to the dizzy spells. She notes an increase in these visual symptoms with stress, particularly in the morning.    Her sleep is often less than eight hours per night, typically around four to five hours, due to stress related to job searching and financial constraints. This stress also affects her eating habits, as she sometimes prioritizes food for her partner over herself.    She has a history of migraines but has not experienced headaches with the current dizziness. She feels irritated by the dizziness episodes and mentions occasional eye twitching. She has not started any new medications recently, except for those prescribed by an allergist, which she has not yet picked up.    She experiences urinary incontinence, which started about two months ago, characterized by leaking urine when coughing or feeling an urgent need to urinate. She initially attributed this to high water intake and denies burning or cloudiness in her urine. No numbness or tingling except on the left side.   Chief Complaint Reviewed and Verified  Nursing Notes Reviewed and   Verified  Tobacco Reviewed  Allergies Reviewed   Medications Reviewed    OB Status Reviewed         Tobacco:  She has never smoked tobacco.    Current Medications[1]       Review of Systems:  See HPI    Objective:   /85   Pulse 98   Temp 97.8 °F (36.6 °C) (Temporal)   Resp 16   Wt 189 lb 12.8 oz (86.1 kg)   LMP 05/17/2025 (Approximate)   BMI 35.38 kg/m²  Estimated body mass index is 35.38 kg/m² as calculated from the following:    Height as of 4/24/25: 5' 1.42\" (1.56 m).    Weight as of this encounter: 189 lb 12.8 oz (86.1 kg).  Results         Physical Exam      Gen:  Well-nourished.  No distress.  CV:  Regular rate and rhythm; no murmurs  Lungs:  Clear to ausculation; good aeration               No wheezes, rales or rhonchi  Neuro: AOx3. PERRLA, EOMI.  Cranial nerves grossly intact.  Muscle strength 5/5 bilaterally.  Normal reflexes. Normal gait. Normal heel to toe. Negative rhomberg. No pronator drift      Assessment & Plan:   1. Dizziness (Primary)  -     Basic Metabolic Panel (8); Future; Expected date: 06/16/2025  -     Assay, Thyroid Stim Hormone; Future; Expected date: 06/16/2025  -     Hemoglobin A1C; Future; Expected date: 06/16/2025  2. Anxiety  -     Behavioral Health Consultation  3. Urinary incontinence, unspecified type    Assessment & Plan  Dizziness  Experiencing dizziness for about a week, described as a sensation similar to getting off a roller coaster. Episodes are brief, lasting a few seconds, and occur randomly without specific head movements. No associated headache, but visual disturbances such as floaters and flashing lights occur throughout the day. Stress and inadequate sleep may be contributing factors. Differential diagnosis includes vestibular issues, dehydration, or anemia. No new medications have been started, and there is a history of low iron levels.  - Order blood tests to check iron levels, electrolytes, and thyroid function.  - Consider stress management techniques and ensure adequate hydration and nutrition.  -  Referral for therapy resources to address stress and anxiety.    Stress and Anxiety  Increased stress due to job search difficulties and inadequate sleep, which may be contributing to symptoms of dizziness and visual disturbances. Expresses interest in speaking with someone about stress.  - Provide a referral for therapy resources to address stress and anxiety.    Urinary Incontinence  Experiencing urinary incontinence over the past two months, particularly when coughing. No burning or cloudy urine, suggesting stress incontinence rather than a urinary tract infection.  - Recommend Kegel exercises, 5-10 sets of 10 repetitions daily, to strengthen the pelvic floor muscles.  - Advise regular bathroom visits and avoiding excessive fluid intake before bedtime.  - Consider referral for pelvic floor physical therapy if symptoms do not improve after a month or two.        No follow-ups on file.        Colleen Weiler, DO, 6/16/2025, 9:14 AM           [1]   Current Outpatient Medications   Medication Sig Dispense Refill    levocetirizine 5 MG Oral Tab Take 1 tablet (5 mg total) by mouth every evening. 30 tablet 0    famotidine 40 MG Oral Tab Take 1 tablet (40 mg total) by mouth daily. 30 tablet 3    Ferrous Sulfate 325 (65 Fe) MG Oral Tab Take 1 tablet (325 mg total) by mouth daily with breakfast. With orange juice. (Patient not taking: Reported on 6/13/2025) 90 tablet 1

## 2025-06-16 NOTE — PROGRESS NOTES
The following individual(s) verbally consented to be recorded using ambient AI listening technology and understand that they can each withdraw their consent to this listening technology at any point by asking the clinician to turn off or pause the recording:    Patient name: Maryuri Trevizo Lucio  Additional names:

## 2025-06-16 NOTE — PATIENT INSTRUCTIONS
Www.pcosaa.org (PCOS Awareness association website)    Florian-inositol 2000 mg two times per day    Instructions for letrozole use:  - cycle day 1 is the first day of your period.  - Take letrozole 2.5 mg (1 pill per day) on day 3-7 of cycle.  - test for ovulation (at home testing kits) on days 12-19   - if no ovulation, will need to bring on a period and do another round with a higher dose - send me a message   - if ovulation, and no period by day 35, check pregnancy test    We can dose up to 7.5 mg letrozole if needed to make you ovulate. If you ovulate, but don't get pregnant, we will repeat a round with the same dose.    If no ovulation at 7.5 mg, referral to fertility specialist is the next step.

## 2025-06-16 NOTE — PROGRESS NOTES
ANNUAL GYN EXAM  [facility]    CHIEF COMPLAINT:    Chief Complaint   Patient presents with    Annual    Fertility     Pt states wants to discuss PCOS, hasn't been able to get pregnant       HISTORY OF PRESENT ILLNESS:   Maryuri Valdes is a 29 year old female   who presents for annual well woman visit.  She is feeling well.    Fertility    Has been with partner x 10 years, used to use depo shot but stopped almost 10 years ago    Partner 31 yo, has a child from previous reltaionship.    Patient's last menstrual period was 2025 (exact date).  Monthly since February, 5-6 days, flow is light, rare cramps    Before then was very irregular nothing predictable    Has been using an luz to track periods it's teling her she should have gotten period last week . (Seems to be based on a 28 d cycle)    A previous doctor had brought up PCOS.    No acne.  No thick coarse hair, does have some over upper lip - did laser treatment    Last pap smear 2-3 years ago, normal no h/o abnormals  No h/o STI    Is exercising: about 2 hours per day  Diet: regular  Mood: good      PAST MEDICAL HISTORY:   Past Medical History[1]     PAST SURGICAL HISTORY:   Past Surgical History[2]     PAST OB HISTORY:  OB History    Para Term  AB Living   0 0 0 0 0 0   SAB IAB Ectopic Multiple Live Births   0 0 0 0 0       CURRENT MEDICATIONS:    Medications - Current[3]    ALLERGIES:  Allergies[4]    SOCIAL HISTORY:  Social Hx on file[5]    FAMILY HISTORY:  Family History[6]  ASSESSMENTS:  REVIEW OF SYSTEMS:  CONSTITUTIONAL:  negative for fevers, chills and sweats    EYES:  negative for  blurred vision and visual disturbance  RESPIRATORY:  negative for  cough and shortness of breath  CARDIOVASCULAR:  negative for  chest pain, palpitations  GASTROINTESTINAL:  No constipation/diarrhea, no pain  GENITOURINARY:  See History of Present Illness  INTEGUMENT/BREAST: Breast: no masses, no nipple discharge  ENDOCRINE:  negative for  acne, constipation, diarrhea, cold intolerance, heat intolerance, fatigue, hair loss, weight gain and weight loss  MUSCULOSKELETAL:  negative for joint pain  NEUROLOGICAL:  negative for dizziness/lightheadedness and headaches  BEHAVIOR/PSYCH:  Negative for depressed mood, anhedonia and anxiety    PHYSICAL EXAM  Patient's last menstrual period was 05/17/2025 (exact date).   Vitals:    06/16/25 1116   BP: 118/64   Weight: 189 lb (85.7 kg)   Height: 61.42\"       CONSTITUTIONAL: Awake, alert, cooperative, no apparent distress, and appears stated age   NECK: Supple, symmetrical, trachea midline, no adenopathy, thyroid symmetric, not enlarged and no tenderness  LUNGS: no excess work of breathing  ABDOMEN: Soft, non-distended, non-tender, no masses palpated    CHEST/BREASTS: Breasts symmetrical, skin without lesion(s), no nipple retraction or dimpling, no nipple discharge, no masses palpated, no axillary or supraclavicular adenopathy  GENITAL/URINARY:    External Genitalia:  General appearance; normal, Hair distribution; normal, Lesions absent   Urethral Meatus:  Lesions absent, Prolapse absent  Bladder:  Tenderness absent, Cystocele absent  Vagina:  Discharge absent, Lesions absent, Pelvic support normal  Cervix:  Lesions absent, Discharge absent, Tenderness absent  Uterus:  Size normal, Masses absent, Tenderness absent  Adnexa:  Masses absent, Tenderness absent  Anus/Perineum:  Lesions absent    MUSCULOSKELETAL: There is no redness, warmth, or swelling of the joints.  Tone is normal.  NEUROLOGIC: Patient is awake, alert and oriented to name, place and time. Casual gait is normal.  SKIN: no bruising or bleeding and no rashes  PSYCHIATRIC: Behavior:  Appropriate  Mood:  appropriate  ASSESSMENT AND PLAN:  1. Visit for gynecologic examination  - CBE and pelvic exam with pap today. Self breast awareness discussed.  - ThinPrep PAP with HPV Reflex Request; Future  - ThinPrep PAP with HPV Reflex Request    2. Pap smear for  cervical cancer screening  - ThinPrep PAP with HPV Reflex Request; Future  - ThinPrep PAP with HPV Reflex Request    3. Encounter for fertility planning  - reveiwed PCOS causing difficulty to predict ovulation. Had recent normal TSH. Offered OI medications, recommend letrozole with her PCOS.  - letrozole 2.5 MG Oral Tab; Take 1 tablet (2.5 mg total) by mouth daily. Take on cycle days 3-7.  Dispense: 5 tablet; Refill: 1    4. PCOS (polycystic ovarian syndrome)  - we discussed this dx. Resources for support given.  - letrozole 2.5 MG Oral Tab; Take 1 tablet (2.5 mg total) by mouth daily. Take on cycle days 3-7.  Dispense: 5 tablet; Refill: 1    follow up as needed  Makayla Jackson DO       [1]   Past Medical History:   Anxiety    PCOS (polycystic ovarian syndrome)   [2]   Past Surgical History:  Procedure Laterality Date    Patient denies any surgical history     [3]   Current Outpatient Medications:     letrozole 2.5 MG Oral Tab, Take 1 tablet (2.5 mg total) by mouth daily. Take on cycle days 3-7., Disp: 5 tablet, Rfl: 1    levocetirizine 5 MG Oral Tab, Take 1 tablet (5 mg total) by mouth every evening., Disp: 30 tablet, Rfl: 0    famotidine 40 MG Oral Tab, Take 1 tablet (40 mg total) by mouth daily., Disp: 30 tablet, Rfl: 3    Ferrous Sulfate 325 (65 Fe) MG Oral Tab, Take 1 tablet (325 mg total) by mouth daily with breakfast. With orange juice. (Patient not taking: Reported on 6/16/2025), Disp: 90 tablet, Rfl: 1  [4]   Allergies  Allergen Reactions    Amoxicillin FACE FLUSHING, ARRHYTHMIA and ANGIOEDEMA    Codeine ANAPHYLAXIS   [5]   Social History  Socioeconomic History    Marital status: Single   Tobacco Use    Smoking status: Never     Passive exposure: Never    Smokeless tobacco: Never   Vaping Use    Vaping status: Never Used   Substance and Sexual Activity    Alcohol use: Yes     Comment: socially - once per month    Drug use: Never   [6]   Family History  Problem Relation Age of Onset    No Known Problems  Father     Diabetes Mother     Other (hernia repaired) Brother     No Known Problems Brother

## 2025-06-18 ENCOUNTER — TELEPHONE (OUTPATIENT)
Dept: OTOLARYNGOLOGY | Facility: CLINIC | Age: 30
End: 2025-06-18

## 2025-06-18 RX ORDER — LEVOCETIRIZINE DIHYDROCHLORIDE 5 MG/1
5 TABLET, FILM COATED ORAL EVERY EVENING
Qty: 90 TABLET | Refills: 0 | Status: CANCELLED | OUTPATIENT
Start: 2025-06-18

## 2025-06-21 ENCOUNTER — HOSPITAL ENCOUNTER (EMERGENCY)
Facility: HOSPITAL | Age: 30
Discharge: HOME | End: 2025-06-21
Attending: STUDENT IN AN ORGANIZED HEALTH CARE EDUCATION/TRAINING PROGRAM
Payer: MEDICAID

## 2025-06-21 ENCOUNTER — APPOINTMENT (OUTPATIENT)
Dept: CT IMAGING | Facility: HOSPITAL | Age: 30
End: 2025-06-21
Attending: STUDENT IN AN ORGANIZED HEALTH CARE EDUCATION/TRAINING PROGRAM
Payer: MEDICAID

## 2025-06-21 ENCOUNTER — NURSE TRIAGE (OUTPATIENT)
Dept: FAMILY MEDICINE CLINIC | Facility: CLINIC | Age: 30
End: 2025-06-21

## 2025-06-21 VITALS
TEMPERATURE: 98 F | SYSTOLIC BLOOD PRESSURE: 122 MMHG | OXYGEN SATURATION: 100 % | HEIGHT: 61 IN | WEIGHT: 189 LBS | BODY MASS INDEX: 35.68 KG/M2 | RESPIRATION RATE: 18 BRPM | HEART RATE: 65 BPM | DIASTOLIC BLOOD PRESSURE: 82 MMHG

## 2025-06-21 DIAGNOSIS — R42 DIZZINESS: ICD-10-CM

## 2025-06-21 DIAGNOSIS — G43.809 OTHER MIGRAINE WITHOUT STATUS MIGRAINOSUS, NOT INTRACTABLE: Primary | ICD-10-CM

## 2025-06-21 LAB
ALBUMIN SERPL-MCNC: 4.9 G/DL (ref 3.2–4.8)
ALBUMIN/GLOB SERPL: 1.4 {RATIO} (ref 1–2)
ALP LIVER SERPL-CCNC: 71 U/L (ref 37–98)
ALT SERPL-CCNC: 26 U/L (ref 10–49)
ANION GAP SERPL CALC-SCNC: 10 MMOL/L (ref 0–18)
AST SERPL-CCNC: 23 U/L (ref ?–34)
BASOPHILS # BLD AUTO: 0.08 X10(3) UL (ref 0–0.2)
BASOPHILS NFR BLD AUTO: 0.8 %
BILIRUB SERPL-MCNC: 0.4 MG/DL (ref 0.3–1.2)
BUN BLD-MCNC: 16 MG/DL (ref 9–23)
CALCIUM BLD-MCNC: 9.9 MG/DL (ref 8.7–10.6)
CHLORIDE SERPL-SCNC: 106 MMOL/L (ref 98–112)
CO2 SERPL-SCNC: 24 MMOL/L (ref 21–32)
CREAT BLD-MCNC: 0.69 MG/DL (ref 0.55–1.02)
EGFRCR SERPLBLD CKD-EPI 2021: 120 ML/MIN/1.73M2 (ref 60–?)
EOSINOPHIL # BLD AUTO: 0.14 X10(3) UL (ref 0–0.7)
EOSINOPHIL NFR BLD AUTO: 1.3 %
ERYTHROCYTE [DISTWIDTH] IN BLOOD BY AUTOMATED COUNT: 16.3 %
GLOBULIN PLAS-MCNC: 3.4 G/DL (ref 2–3.5)
GLUCOSE BLD-MCNC: 102 MG/DL (ref 70–99)
HCT VFR BLD AUTO: 41 % (ref 35–48)
HGB BLD-MCNC: 13.3 G/DL (ref 12–16)
IMM GRANULOCYTES # BLD AUTO: 0.03 X10(3) UL (ref 0–1)
IMM GRANULOCYTES NFR BLD: 0.3 %
LYMPHOCYTES # BLD AUTO: 4.12 X10(3) UL (ref 1–4)
LYMPHOCYTES NFR BLD AUTO: 39.2 %
MCH RBC QN AUTO: 27.6 PG (ref 26–34)
MCHC RBC AUTO-ENTMCNC: 32.4 G/DL (ref 31–37)
MCV RBC AUTO: 85.1 FL (ref 80–100)
MONOCYTES # BLD AUTO: 0.73 X10(3) UL (ref 0.1–1)
MONOCYTES NFR BLD AUTO: 7 %
NEUTROPHILS # BLD AUTO: 5.4 X10 (3) UL (ref 1.5–7.7)
NEUTROPHILS # BLD AUTO: 5.4 X10(3) UL (ref 1.5–7.7)
NEUTROPHILS NFR BLD AUTO: 51.4 %
OSMOLALITY SERPL CALC.SUM OF ELEC: 291 MOSM/KG (ref 275–295)
PLATELET # BLD AUTO: 314 10(3)UL (ref 150–450)
POTASSIUM SERPL-SCNC: 4.2 MMOL/L (ref 3.5–5.1)
PROT SERPL-MCNC: 8.3 G/DL (ref 5.7–8.2)
RBC # BLD AUTO: 4.82 X10(6)UL (ref 3.8–5.3)
SODIUM SERPL-SCNC: 140 MMOL/L (ref 136–145)
WBC # BLD AUTO: 10.5 X10(3) UL (ref 4–11)

## 2025-06-21 PROCEDURE — 96361 HYDRATE IV INFUSION ADD-ON: CPT

## 2025-06-21 PROCEDURE — 96374 THER/PROPH/DIAG INJ IV PUSH: CPT

## 2025-06-21 PROCEDURE — 99285 EMERGENCY DEPT VISIT HI MDM: CPT

## 2025-06-21 PROCEDURE — 85025 COMPLETE CBC W/AUTO DIFF WBC: CPT | Performed by: STUDENT IN AN ORGANIZED HEALTH CARE EDUCATION/TRAINING PROGRAM

## 2025-06-21 PROCEDURE — 70450 CT HEAD/BRAIN W/O DYE: CPT | Performed by: STUDENT IN AN ORGANIZED HEALTH CARE EDUCATION/TRAINING PROGRAM

## 2025-06-21 PROCEDURE — 99284 EMERGENCY DEPT VISIT MOD MDM: CPT

## 2025-06-21 PROCEDURE — 96375 TX/PRO/DX INJ NEW DRUG ADDON: CPT

## 2025-06-21 PROCEDURE — 80053 COMPREHEN METABOLIC PANEL: CPT | Performed by: STUDENT IN AN ORGANIZED HEALTH CARE EDUCATION/TRAINING PROGRAM

## 2025-06-21 RX ORDER — METOCLOPRAMIDE HYDROCHLORIDE 5 MG/ML
10 INJECTION INTRAMUSCULAR; INTRAVENOUS ONCE
Status: COMPLETED | OUTPATIENT
Start: 2025-06-21 | End: 2025-06-21

## 2025-06-21 RX ORDER — MECLIZINE HYDROCHLORIDE 25 MG/1
25 TABLET ORAL 3 TIMES DAILY PRN
Qty: 30 TABLET | Refills: 0 | Status: SHIPPED | OUTPATIENT
Start: 2025-06-21

## 2025-06-21 RX ORDER — DIPHENHYDRAMINE HYDROCHLORIDE 50 MG/ML
25 INJECTION, SOLUTION INTRAMUSCULAR; INTRAVENOUS ONCE
Status: COMPLETED | OUTPATIENT
Start: 2025-06-21 | End: 2025-06-21

## 2025-06-21 NOTE — ED PROVIDER NOTES
Patient Seen in: [facility] Emergency Department        History  Chief Complaint   Patient presents with    Headache    Dizziness     Stated Complaint: headache , dizziness x 2 weeks    Subjective:   HPI            The patient is a 29-year-old female presented to the emergency room with reports of progressively gradually worsening headache.  States it was dull at the start located all around her head.  Had episodes of intermittent dizziness where she feels like she is spinning.  She denies any nausea vomiting or photophobia.  She notes that she has had some flashes of light bilaterally though.  She notes when she was a child she suffered from migraines but that she has not experienced one in several years.  She has no neck stiffness associated fevers.  She denies any focal neurological deficit.  Patient is here noting that she is concerned she may have a tumor in her brain or some other abnormality.      Objective:     Past Medical History:    Anxiety    PCOS (polycystic ovarian syndrome)              Past Surgical History:   Procedure Laterality Date    Patient denies any surgical history                  Social History     Socioeconomic History    Marital status: Single   Tobacco Use    Smoking status: Never     Passive exposure: Never    Smokeless tobacco: Never   Vaping Use    Vaping status: Never Used   Substance and Sexual Activity    Alcohol use: Yes     Comment: socially - once per month    Drug use: Never     Social Drivers of Health     Food Insecurity: No Food Insecurity (4/24/2025)    NCSS - Food Insecurity     Worried About Running Out of Food in the Last Year: No     Ran Out of Food in the Last Year: No   Transportation Needs: No Transportation Needs (4/24/2025)    NCSS - Transportation     Lack of Transportation: No   Housing Stability: Not At Risk (4/24/2025)    NCSS - Housing/Utilities     Has Housing: Yes     Worried About Losing Housing: No     Unable to Get Utilities: No                                 Physical Exam    ED Triage Vitals [06/21/25 1103]   /82   Pulse 65   Resp 18   Temp 98 °F (36.7 °C)   Temp src    SpO2 100 %   O2 Device None (Room air)       Current Vitals:   Vital Signs  BP: 122/82  Pulse: 65  Resp: 18  Temp: 98 °F (36.7 °C)    Oxygen Therapy  SpO2: 100 %  O2 Device: None (Room air)            Physical Exam  Vitals and nursing note reviewed.   Constitutional:       General: She is not in acute distress.     Appearance: Normal appearance.   HENT:      Head: Normocephalic.      Nose: Nose normal.      Mouth/Throat:      Mouth: Mucous membranes are moist.   Eyes:      General: No visual field deficit.     Extraocular Movements: Extraocular movements intact.      Pupils: Pupils are equal, round, and reactive to light.   Cardiovascular:      Rate and Rhythm: Normal rate and regular rhythm.      Pulses: Normal pulses.   Pulmonary:      Effort: Pulmonary effort is normal.   Abdominal:      General: Abdomen is flat. Bowel sounds are normal. There is no distension.      Palpations: Abdomen is soft.      Tenderness: There is no abdominal tenderness. There is no right CVA tenderness, left CVA tenderness, guarding or rebound.      Hernia: No hernia is present.   Musculoskeletal:         General: No swelling or tenderness. Normal range of motion.      Cervical back: Normal range of motion.   Skin:     General: Skin is warm and dry.   Neurological:      Mental Status: She is alert and oriented to person, place, and time. Mental status is at baseline.      GCS: GCS eye subscore is 4. GCS verbal subscore is 5. GCS motor subscore is 6.      Cranial Nerves: No cranial nerve deficit, dysarthria or facial asymmetry.      Sensory: No sensory deficit.      Motor: No weakness.      Coordination: Romberg sign negative. Coordination normal.      Gait: Gait normal.   Psychiatric:         Mood and Affect: Mood normal.                 ED Course  Labs Reviewed   CBC WITH DIFFERENTIAL WITH  PLATELET - Abnormal; Notable for the following components:       Result Value    Lymphocyte Absolute 4.12 (*)     All other components within normal limits   COMP METABOLIC PANEL (14) - Abnormal; Notable for the following components:    Glucose 102 (*)     Total Protein 8.3 (*)     Albumin 4.9 (*)     All other components within normal limits   RAINBOW DRAW LAVENDER   RAINBOW DRAW LIGHT GREEN          CT BRAIN OR HEAD (CPT=70450)  Result Date: 6/21/2025  CONCLUSION:  No acute intracranial abnormality.    LOCATION:  [facility]   Dictated by (CST): Gonzalo Geronimo MD on 6/21/2025 at 12:22 PM     Finalized by (CST): Gonzalo Geronimo MD on 6/21/2025 at 12:23 PM                         [facility]             Medical Decision Making    Differential includes migraine with an aura, lower suspicion for intracranial mass but still on the differential, metabolic derangement, benign positional vertigo, sinusitis    Patient afebrile hemodynamically stable here.  She has no focal deficits on neurological exam.  She has no auditory canal pathology.    Patient provided IV fluids along with Reglan Benadryl and sent for CT.  I reviewed her head imaging studies and my independent or potation's there is no evidence of mass causing mass effect.  Formal report also states no acute intracranial abnormality.  Patient felt better after Reglan and Benadryl with fluids.  Patient will be discharged with meclizine to take as needed as well in the event her symptoms could be vertiginous in nature.  Patient encouraged to follow-up with PCP and has been given neuro follow-up as well for migraine evaluation.  Disposition and Plan     Clinical Impression:  1. Other migraine without status migrainosus, not intractable    2. Dizziness         Disposition:  Discharge  6/21/2025 12:52 pm    Follow-up:  Weiler, Colleen M, DO  [address] [address]  [phone]    Follow up      Bin Vega MD  [facility]  [id number]  [phone]    Follow up            Medications Prescribed:  Discharge Medication List as of 6/21/2025  1:00 PM        START taking these medications    Details   meclizine 25 MG Oral Tab Take 1 tablet (25 mg total) by mouth 3 (three) times daily as needed., Normal, Disp-30 tablet, R-0                   Supplementary Documentation:

## 2025-06-21 NOTE — TELEPHONE ENCOUNTER
FYI Dr. Weiler  Action Requested: Summary for Provider     []  Critical Lab, Recommendations Needed  [] Need Additional Advice  [x]   JESSICA    []   Need Orders  [] Need Medications Sent to Pharmacy  []  Other     SUMMARY: Intermittent dizziness with swaying sensation, feeling like \"brain squeezing\", last episode this morning--> episodes last 30 seconds to 1 minute. Intermittent blurred vision, black spots and lights flashing intermittently [evaluated previously]. 1 month ago weakness of left arm [going to PT]; neck stiffness, able to touch chin to chest; Chest tightness 1-2 weeks ago,l atrributed to being anxious. Advised Emergency Department now, do NOT drive--> agreeable. [See below for more details]     Reason for call: Dizziness  Onset: 2-3 weeks, recently this morning    Reason for Disposition   Loss of vision or double vision  (Exception: Similar to previous migraines.)    Protocols used: Dizziness-A-OH      Patient called states she has been having intermittent dizziness and feels some swaying sensation, the episodes last for about 30 seconds - 1 minute and have been getting longer. Denies any dizziness now, but did have an episode about 1 hour ago, swaying sensation present at the time of episode. She states she feels pressure of head, like getting \"brain squeezed\". She states she has had chest tightness in the past about 1-2 weeks ago, but she believes it is related to stress. Her arm of left side is weaker than right, occurred 1 month ago. No known injury. Able to smile, raise eyebrows, stick out tongue, raise arms without pronator drift. Some neck stiffness, able to touch chin to chest. Denies any shortness of breath, chest pain, and/or chest tightness. She sees some blurred vision, black spots and flashing lights intermittently. Refer to system/assessment yes/no answers. Advised Emergency Department now, do NOT drive--> agreeable and will go to [facility]. Patient instructed any new or  worsening symptoms [reviewed] seek immediate medical attention-->911. Home Care Advice discussed, per protocol. Patient also advised to schedule an Emergency Department follow-up visit upon discharge. Patient verbalized understanding. No further questions or concerns at this time.

## 2025-06-21 NOTE — ED INITIAL ASSESSMENT (HPI)
Patient in with HA and dizziness x 2 weeks. Denies hx vertigo. PCP sent patient in as was unable to get into PCP until August. No nausea at this time. No photophobia. States has HA years past. Ambulatory A&Ox3 no neuro deficits

## 2025-06-25 ENCOUNTER — OFFICE VISIT (OUTPATIENT)
Dept: FAMILY MEDICINE CLINIC | Facility: CLINIC | Age: 30
End: 2025-06-25
Payer: MEDICAID

## 2025-06-25 ENCOUNTER — PATIENT MESSAGE (OUTPATIENT)
Dept: FAMILY MEDICINE CLINIC | Facility: CLINIC | Age: 30
End: 2025-06-25

## 2025-06-25 VITALS
RESPIRATION RATE: 16 BRPM | TEMPERATURE: 98 F | SYSTOLIC BLOOD PRESSURE: 104 MMHG | HEART RATE: 99 BPM | BODY MASS INDEX: 34 KG/M2 | WEIGHT: 181 LBS | DIASTOLIC BLOOD PRESSURE: 70 MMHG

## 2025-06-25 DIAGNOSIS — Z11.3 SCREENING FOR STD (SEXUALLY TRANSMITTED DISEASE): ICD-10-CM

## 2025-06-25 DIAGNOSIS — F41.9 ANXIETY: ICD-10-CM

## 2025-06-25 DIAGNOSIS — R73.02 IMPAIRED GLUCOSE TOLERANCE: ICD-10-CM

## 2025-06-25 DIAGNOSIS — R42 DIZZINESS: Primary | ICD-10-CM

## 2025-06-25 LAB
HAV AB SER QL IA: REACTIVE
HAV IGM SER QL: NONREACTIVE
HBV CORE AB SERPL QL IA: NONREACTIVE
HBV SURFACE AB SER QL: NONREACTIVE
HBV SURFACE AB SERPL IA-ACNC: <3.1 MIU/ML
HBV SURFACE AG SERPL QL IA: NONREACTIVE
HCV AB SERPL QL IA: NONREACTIVE
T PALLIDUM AB SER QL IA: REACTIVE

## 2025-06-25 PROCEDURE — 99214 OFFICE O/P EST MOD 30 MIN: CPT | Performed by: FAMILY MEDICINE

## 2025-06-25 NOTE — PROGRESS NOTES
Subjective:   Maryuri Valdes is a 29 year old female who presents for ER F/U (Pt was not feeling better, and went to the ER after speaking with triage nurse. H/o a migraines as a child. Recently has been dizzy, possibly due to stress), Dizziness, and Lab Results       History/Other:   History of Present Illness  Maryuri Valdes is a 29 year old female who presents with headaches and concerns about potential stroke symptoms.    She experienced headaches on Friday, prompting her to seek medical attention. During a phone call with a nurse, she was advised to go to the emergency room due to concerns about potential stroke symptoms. At the ER, a head scan and basic blood work were performed. Her glucose level was slightly elevated at 102, but she had recently eaten, making this result normal. Previous lab work in June showed slightly low iron levels, but not enough to cause anemia or dizziness. Her hemoglobin A1c was 5.7, indicating prediabetes. She has been increasing her intake of iron-rich foods like lentils and beets, which she believes has improved her iron levels.    Her headaches are more pronounced during periods of stress or overthinking. She experiences difficulty sleeping, often feeling tired during the day but unable to sleep at night. She engages in two hours of exercise daily, which she finds helpful for managing stress and anxiety.    She has concerns about potential STDs after discovering her partner was unfaithful, although she has no symptoms. She requested testing for HIV, syphilis, hepatitis, gonorrhea, and chlamydia.    She reports left knee pain, particularly under the kneecap, which worsens with walking, going up and down stairs, or sitting for long periods. She feels relief when the knee 'pops'.     Chief Complaint Reviewed and Verified  Nursing Notes Reviewed and   Verified  Tobacco Reviewed  Allergies Reviewed  Medications Reviewed    OB Status Reviewed          Tobacco:  She has never smoked tobacco.    Current Medications[1]         Review of Systems:  See HPI      Objective:   /70   Pulse 99   Temp 97.8 °F (36.6 °C) (Temporal)   Resp 16   Wt 181 lb (82.1 kg)   LMP 06/21/2025 (Approximate)   BMI 34.20 kg/m²  Estimated body mass index is 34.2 kg/m² as calculated from the following:    Height as of 6/21/25: 5' 1\" (1.549 m).    Weight as of this encounter: 181 lb (82.1 kg).  Results  LABS  Glucose: 102 mg/dL (06/20/2025)  Protein: Slightly elevated (06/20/2025)  Albumin: Slightly elevated (06/20/2025)  Iron: Slightly low (06/16/2025)  HbA1c: 5.7% (06/16/2025)  Electrolytes: Within normal limits (06/16/2025)  TSH: Within normal limits (06/16/2025)  RDW: Within normal limits (06/20/2025)  Lymphocytes: Slightly elevated (06/20/2025)    RADIOLOGY  Head CT: Normal (06/20/2025)     Physical Exam      Gen:  Well-nourished.  No distress.  CV:  Regular rate and rhythm; no murmurs  Lungs:  Clear to ausculation; good aeration               No wheezes, rales or rhonchi  Neuro: AOx3. PERRLA, EOMI.  Cranial nerves grossly intact.  Muscle strength 5/5 bilaterally.  Normal reflexes. Normal gait. Normal heel to toe. Negative rhomberg. No pronator drift      Assessment & Plan:   1. Dizziness (Primary)  2. Impaired glucose tolerance  -     DIETITIAN EDUCATION INITIAL, DIET (INTERNAL)  3. Anxiety  4. Screening for STD (sexually transmitted disease)  -     Chlamydia/Gc Amplification; Future; Expected date: 06/25/2025  -     Hepatitis A B + C profile; Future; Expected date: 06/25/2025  -     HIV Ag/Ab Combo; Future; Expected date: 06/25/2025  -     T Pallidum Screening [place]; Future; Expected date: 06/25/2025    Assessment & Plan  Headache  Headaches initially suspected as a stroke, but emergency evaluation indicated vertigo or migraine. CT scan was normal, and blood work showed no significant abnormalities. Symptoms appear stress-related, exacerbated by stress or overthinking.  Therapy appointment scheduled for stress management.  - Proceed with therapy for stress management  - Consider neurologist referral if headaches persist despite stress management    Left knee pain  Pain under the left patella, especially during ambulation or after prolonged sitting, likely due to weak rancho-patellar muscles causing maltracking. Provided exercises to strengthen these muscles.  - Provide handout for muscle strengthening exercises  - Consider physical therapy referral if pain persists despite exercises    Prediabetes  Hemoglobin A1c is 5.7, indicating prediabetes. Recent glucose level was 102, a normal postprandial level. She is interested in dietary changes and requested a nutritionist consultation.  - Refer to dietitian for dietary management and education    STD screening  Requests STD screening after partner's infidelity. Asymptomatic for STDs, but screening requested for reassurance.  - Order blood tests for HIV, syphilis, and hepatitis  - Order urine test for gonorrhea and chlamydia        No follow-ups on file.        Colleen Weiler, DO, 6/25/2025, 2:39 PM           [1]   Current Outpatient Medications   Medication Sig Dispense Refill    meclizine 25 MG Oral Tab Take 1 tablet (25 mg total) by mouth 3 (three) times daily as needed. 30 tablet 0    letrozole 2.5 MG Oral Tab Take 1 tablet (2.5 mg total) by mouth daily. Take on cycle days 3-7. 5 tablet 1    levocetirizine 5 MG Oral Tab Take 1 tablet (5 mg total) by mouth every evening. 30 tablet 0    famotidine 40 MG Oral Tab Take 1 tablet (40 mg total) by mouth daily. 30 tablet 3

## 2025-06-26 ENCOUNTER — TELEPHONE (OUTPATIENT)
Dept: FAMILY MEDICINE CLINIC | Facility: CLINIC | Age: 30
End: 2025-06-26

## 2025-06-26 DIAGNOSIS — Z23 NEED FOR VACCINATION AGAINST HEPATITIS B VIRUS: Primary | ICD-10-CM

## 2025-06-26 DIAGNOSIS — Z01.84 IMMUNITY STATUS TESTING: ICD-10-CM

## 2025-06-26 LAB
C TRACH DNA SPEC QL NAA+PROBE: NEGATIVE
N GONORRHOEA DNA SPEC QL NAA+PROBE: NEGATIVE

## 2025-06-26 NOTE — TELEPHONE ENCOUNTER
Hepatitis A and B are reactive because she likely has antibodies from immunizations. No active infection.     Her syphilis test was reactive but there is more testing in progress so we are unable to determine if she has had a past exposure or if she has an active infection.  She needs to wait until the further tests are back.  I will let her know as soon as that information is received.  She should not be sexually active until we receive these results.  Hopefully, it will just be a few days.

## 2025-06-26 NOTE — TELEPHONE ENCOUNTER
Dr. Weiler, please advise on need for hepatitis B vaccine, pended for review, or other recommendations. Thank you.      Patient contacted (name and  of patient verified). Dr. Weiler's message reviewed. Patient verbalizes understanding.  She states she is unsure when she needs to get hepatitis B vaccine. States she believes she did receive 3 shot series in past. Per result review, Hepatitis B antibody shows no protective immunity.  Per chart review, no Hepatitis B vaccine series noted.  Will see what primary care provider recommends.       Per result review, Hepatitis B antibody shows \"no protective immunity\":  Hepatitis B Surface Antibody Nonreactive Abnormal       Heptatitis B Surface Ab Quant <3.10   Comment: < 10 mIU/mL no protective immunity

## 2025-06-26 NOTE — TELEPHONE ENCOUNTER
Called and spoke with patient and identified full name and date of birth.  Relayed Dr. Weiler message and patient voiced understanding with treatment plan  Nurse visit scheduled    Future Appointments   Date Time Provider Department Center   6/30/2025  9:00 AM [facility] NURSING [facility]   7/25/2025  8:20 AM Pura Morales MD [facility]

## 2025-06-26 NOTE — TELEPHONE ENCOUNTER
Dr. Weiler, patient viewed test results and is very concerned, states she is \"freaking out.\" Requesting primary care provider review and recommendations. Advised her there is still 1 test pending and may need to await results. Thank you.    Spoke to patient (name and  of patient verified). She is calling due to abnormal test results. She is very concerned. Would like to know what test results mean. Per chart review, one test is still pending. Advised patient provider may need to see that result for more information before making recommendations/prescribing any treatment. Patient states she is \"freaking out.\" Would like Dr. Weiler to know that she is concerned and see if she has any recommendations now.    T Pallidum Screening [place]  Component  Ref Range & Units (hover) 25  2:42 PM   Treponemal Antibodies Reactive Abnormal    Comment: This is a preliminary result. Reactive results may represent recent, past, or successfully treated syphilis. Please correlate with confirmatory results to follow in approximately 1 week.   Resulting Agency [facility]     Specimen Collected: 25  2:42 PM Last Resulted: 25  9:09 PM       Hepatitis A B + C profile   Hepatitis A Virus Ab, Total Reactive Abnormal      Hepatitis B Surface Antibody Nonreactive Abnormal      Heptatitis B Surface Ab Quant <3.10   Comment: < 10 mIU/mL no protective immunity     Hep A AB, IGM      Component  Ref Range & Units (hover) 25  2:42 PM   Hepatitis A Ab, IgM Nonreactive

## 2025-06-27 LAB — RPR SER QL: NONREACTIVE

## 2025-06-30 ENCOUNTER — NURSE ONLY (OUTPATIENT)
Dept: FAMILY MEDICINE CLINIC | Facility: CLINIC | Age: 30
End: 2025-06-30
Payer: MEDICAID

## 2025-06-30 ENCOUNTER — TELEPHONE (OUTPATIENT)
Dept: FAMILY MEDICINE CLINIC | Facility: CLINIC | Age: 30
End: 2025-06-30

## 2025-06-30 DIAGNOSIS — Z23 NEED FOR VACCINATION: Primary | ICD-10-CM

## 2025-06-30 LAB
TREPONEMAL ANTIBODIES, TPPA: NON REACTIVE
TREPONEMAL ANTIBODIES, TPPA: NON REACTIVE

## 2025-06-30 PROCEDURE — 90471 IMMUNIZATION ADMIN: CPT | Performed by: FAMILY MEDICINE

## 2025-06-30 PROCEDURE — 90746 HEPB VACCINE 3 DOSE ADULT IM: CPT | Performed by: FAMILY MEDICINE

## 2025-06-30 NOTE — TELEPHONE ENCOUNTER
Patient came into the [id number] to have her Hep B injection the 1st one today 6-30-25.  Not sure when she should return. Is it with the doctor or nurse.

## 2025-06-30 NOTE — TELEPHONE ENCOUNTER
Spoke with patient. She confirmed with her mother that she had been vaccinated growing up. Per Dr Weiler, advised to get another titer done in 6 - 8 weeks after Hep B booster; no need for another vaccine at this time. Patient voiced understanding.

## 2025-07-17 ENCOUNTER — NURSE TRIAGE (OUTPATIENT)
Dept: FAMILY MEDICINE CLINIC | Facility: CLINIC | Age: 30
End: 2025-07-17

## 2025-07-17 NOTE — TELEPHONE ENCOUNTER
Action Requested: Summary for Provider     []  Critical Lab, Recommendations Needed  [] Need Additional Advice  []   FYI    []   Need Orders  [] Need Medications Sent to Pharmacy  []  Other     SUMMARY: Reports swelling eyelid and pain. Home care advise given.     Reason for call: Eye Problem  Onset: This morning    Patient reports she woke up this morning with her eye swollen. Left eye outer eye. Patient states it hurst when she blinks. Patient reports eye is itching. Denies fever, ever occurring before, blurry vision, rash, runny nose. Patient reports having yellow eye boogers this morning. Denies pregnancy. Home care advise given. Emergency signs discussed. Patient verbalized understanding. No further questions at this time.                     Reason for Disposition   Eyelid swelling from suspected mild irritant    Protocols used: Eye - Swelling-A-OH

## 2025-07-28 ENCOUNTER — HOSPITAL ENCOUNTER (EMERGENCY)
Facility: HOSPITAL | Age: 30
Discharge: HOME | End: 2025-07-28
Attending: EMERGENCY MEDICINE

## 2025-07-28 VITALS
SYSTOLIC BLOOD PRESSURE: 125 MMHG | HEIGHT: 61 IN | TEMPERATURE: 98 F | DIASTOLIC BLOOD PRESSURE: 83 MMHG | HEART RATE: 59 BPM | WEIGHT: 183 LBS | BODY MASS INDEX: 34.55 KG/M2 | RESPIRATION RATE: 18 BRPM | OXYGEN SATURATION: 98 %

## 2025-07-28 DIAGNOSIS — K29.00 ACUTE GASTRITIS WITHOUT HEMORRHAGE, UNSPECIFIED GASTRITIS TYPE: Primary | ICD-10-CM

## 2025-07-28 LAB
ANION GAP SERPL CALC-SCNC: 6 MMOL/L (ref 0–18)
B-HCG UR QL: NEGATIVE
BASOPHILS # BLD AUTO: 0.06 X10(3) UL (ref 0–0.2)
BASOPHILS NFR BLD AUTO: 0.5 %
BUN BLD-MCNC: 18 MG/DL (ref 9–23)
BUN/CREAT SERPL: 18.9 (ref 10–20)
CALCIUM BLD-MCNC: 9.1 MG/DL (ref 8.7–10.4)
CHLORIDE SERPL-SCNC: 105 MMOL/L (ref 98–112)
CO2 SERPL-SCNC: 27 MMOL/L (ref 21–32)
CREAT BLD-MCNC: 0.95 MG/DL (ref 0.55–1.02)
DEPRECATED RDW RBC AUTO: 44.4 FL (ref 35.1–46.3)
EGFRCR SERPLBLD CKD-EPI 2021: 83 ML/MIN/1.73M2 (ref 60–?)
EOSINOPHIL # BLD AUTO: 0.21 X10(3) UL (ref 0–0.7)
EOSINOPHIL NFR BLD AUTO: 1.8 %
ERYTHROCYTE [DISTWIDTH] IN BLOOD BY AUTOMATED COUNT: 14.3 % (ref 11–15)
GLUCOSE BLD-MCNC: 147 MG/DL (ref 70–99)
HCT VFR BLD AUTO: 36.6 % (ref 35–48)
HGB BLD-MCNC: 12 G/DL (ref 12–16)
IMM GRANULOCYTES # BLD AUTO: 0.03 X10(3) UL (ref 0–1)
IMM GRANULOCYTES NFR BLD: 0.3 %
LYMPHOCYTES # BLD AUTO: 3.94 X10(3) UL (ref 1–4)
LYMPHOCYTES NFR BLD AUTO: 33.5 %
MCH RBC QN AUTO: 28 PG (ref 26–34)
MCHC RBC AUTO-ENTMCNC: 32.8 G/DL (ref 31–37)
MCV RBC AUTO: 85.3 FL (ref 80–100)
MONOCYTES # BLD AUTO: 0.84 X10(3) UL (ref 0.1–1)
MONOCYTES NFR BLD AUTO: 7.1 %
NEUTROPHILS # BLD AUTO: 6.68 X10 (3) UL (ref 1.5–7.7)
NEUTROPHILS # BLD AUTO: 6.68 X10(3) UL (ref 1.5–7.7)
NEUTROPHILS NFR BLD AUTO: 56.8 %
OSMOLALITY SERPL CALC.SUM OF ELEC: 291 MOSM/KG (ref 275–295)
PLATELET # BLD AUTO: 319 10(3)UL (ref 150–450)
POTASSIUM SERPL-SCNC: 3.8 MMOL/L (ref 3.5–5.1)
RBC # BLD AUTO: 4.29 X10(6)UL (ref 3.8–5.3)
SODIUM SERPL-SCNC: 138 MMOL/L (ref 136–145)
WBC # BLD AUTO: 11.8 X10(3) UL (ref 4–11)

## 2025-07-28 PROCEDURE — 96375 TX/PRO/DX INJ NEW DRUG ADDON: CPT

## 2025-07-28 PROCEDURE — 80048 BASIC METABOLIC PNL TOTAL CA: CPT | Performed by: EMERGENCY MEDICINE

## 2025-07-28 PROCEDURE — 96374 THER/PROPH/DIAG INJ IV PUSH: CPT

## 2025-07-28 PROCEDURE — 99284 EMERGENCY DEPT VISIT MOD MDM: CPT

## 2025-07-28 PROCEDURE — 81025 URINE PREGNANCY TEST: CPT

## 2025-07-28 PROCEDURE — 85025 COMPLETE CBC W/AUTO DIFF WBC: CPT | Performed by: EMERGENCY MEDICINE

## 2025-07-28 RX ORDER — MAGNESIUM HYDROXIDE/ALUMINUM HYDROXICE/SIMETHICONE 120; 1200; 1200 MG/30ML; MG/30ML; MG/30ML
30 SUSPENSION ORAL ONCE
Status: COMPLETED | OUTPATIENT
Start: 2025-07-28 | End: 2025-07-28

## 2025-07-28 RX ORDER — LIDOCAINE HYDROCHLORIDE 20 MG/ML
10 SOLUTION OROPHARYNGEAL ONCE
Status: COMPLETED | OUTPATIENT
Start: 2025-07-28 | End: 2025-07-28

## 2025-07-28 RX ORDER — KETOROLAC TROMETHAMINE 15 MG/ML
15 INJECTION, SOLUTION INTRAMUSCULAR; INTRAVENOUS ONCE
Status: COMPLETED | OUTPATIENT
Start: 2025-07-28 | End: 2025-07-28

## 2025-07-28 RX ORDER — SUCRALFATE 1 G/1
1 TABLET ORAL
Qty: 40 TABLET | Refills: 0 | Status: SHIPPED | OUTPATIENT
Start: 2025-07-28 | End: 2025-08-07

## 2025-07-28 RX ORDER — FAMOTIDINE 20 MG/1
20 TABLET, FILM COATED ORAL 2 TIMES DAILY PRN
Qty: 30 TABLET | Refills: 0 | Status: SHIPPED | OUTPATIENT
Start: 2025-07-28 | End: 2025-08-27

## 2025-07-28 RX ORDER — FAMOTIDINE 10 MG/ML
20 INJECTION, SOLUTION INTRAVENOUS ONCE
Status: COMPLETED | OUTPATIENT
Start: 2025-07-28 | End: 2025-07-28

## 2025-07-30 ENCOUNTER — OFFICE VISIT (OUTPATIENT)
Dept: FAMILY MEDICINE CLINIC | Facility: CLINIC | Age: 30
End: 2025-07-30
Payer: MEDICAID

## 2025-07-30 VITALS
TEMPERATURE: 98 F | WEIGHT: 179 LBS | SYSTOLIC BLOOD PRESSURE: 114 MMHG | HEART RATE: 76 BPM | RESPIRATION RATE: 16 BRPM | BODY MASS INDEX: 34 KG/M2 | DIASTOLIC BLOOD PRESSURE: 74 MMHG

## 2025-07-30 DIAGNOSIS — K29.00 ACUTE GASTRITIS WITHOUT HEMORRHAGE, UNSPECIFIED GASTRITIS TYPE: Primary | ICD-10-CM

## 2025-07-30 DIAGNOSIS — R20.0 BILATERAL HAND NUMBNESS: ICD-10-CM

## 2025-07-30 PROCEDURE — 99214 OFFICE O/P EST MOD 30 MIN: CPT | Performed by: FAMILY MEDICINE

## 2025-08-06 ENCOUNTER — LAB ENCOUNTER (OUTPATIENT)
Dept: LAB | Facility: REFERENCE LAB | Age: 30
End: 2025-08-06
Attending: FAMILY MEDICINE

## 2025-08-06 DIAGNOSIS — Z11.3 SCREENING FOR STD (SEXUALLY TRANSMITTED DISEASE): ICD-10-CM

## 2025-08-06 LAB — T PALLIDUM AB SER QL IA: REACTIVE

## 2025-08-06 PROCEDURE — 36415 COLL VENOUS BLD VENIPUNCTURE: CPT

## 2025-08-06 PROCEDURE — 86780 TREPONEMA PALLIDUM: CPT

## 2025-08-06 PROCEDURE — 86592 SYPHILIS TEST NON-TREP QUAL: CPT

## 2025-08-07 LAB — RPR SER QL: NONREACTIVE

## 2025-08-11 LAB
TREPONEMAL ANTIBODIES, TPPA: NON REACTIVE
TREPONEMAL ANTIBODIES, TPPA: NON REACTIVE

## 2025-08-15 ENCOUNTER — TELEPHONE (OUTPATIENT)
Dept: OTOLARYNGOLOGY | Facility: CLINIC | Age: 30
End: 2025-08-15

## 2025-08-15 ENCOUNTER — OFFICE VISIT (OUTPATIENT)
Dept: OTOLARYNGOLOGY | Facility: CLINIC | Age: 30
End: 2025-08-15

## 2025-08-15 DIAGNOSIS — K21.9 LARYNGOPHARYNGEAL REFLUX (LPR): ICD-10-CM

## 2025-08-15 DIAGNOSIS — J34.2 NASAL SEPTAL DEVIATION: ICD-10-CM

## 2025-08-15 DIAGNOSIS — J34.3 NASAL TURBINATE HYPERTROPHY: ICD-10-CM

## 2025-08-15 DIAGNOSIS — R09.89 CHRONIC THROAT CLEARING: Primary | ICD-10-CM

## 2025-08-15 RX ORDER — LEVOCETIRIZINE DIHYDROCHLORIDE 5 MG/1
5 TABLET, FILM COATED ORAL EVERY EVENING
Qty: 30 TABLET | Refills: 5 | Status: SHIPPED | OUTPATIENT
Start: 2025-08-15

## 2025-08-15 RX ORDER — FAMOTIDINE 20 MG/1
20 TABLET, FILM COATED ORAL NIGHTLY
Qty: 30 TABLET | Refills: 5 | Status: SHIPPED | OUTPATIENT
Start: 2025-08-15

## 2025-08-15 RX ORDER — OMEPRAZOLE 40 MG/1
40 CAPSULE, DELAYED RELEASE ORAL DAILY
Qty: 30 CAPSULE | Refills: 5 | Status: SHIPPED | OUTPATIENT
Start: 2025-08-15

## 2025-08-20 ENCOUNTER — NURSE TRIAGE (OUTPATIENT)
Dept: FAMILY MEDICINE CLINIC | Facility: CLINIC | Age: 30
End: 2025-08-20

## 2025-08-26 ENCOUNTER — OFFICE VISIT (OUTPATIENT)
Dept: FAMILY MEDICINE CLINIC | Facility: CLINIC | Age: 30
End: 2025-08-26

## 2025-08-26 ENCOUNTER — ORDER TRANSCRIPTION (OUTPATIENT)
Dept: ADMINISTRATIVE | Facility: HOSPITAL | Age: 30
End: 2025-08-26

## 2025-08-26 VITALS
RESPIRATION RATE: 16 BRPM | TEMPERATURE: 98 F | SYSTOLIC BLOOD PRESSURE: 113 MMHG | HEART RATE: 71 BPM | DIASTOLIC BLOOD PRESSURE: 76 MMHG | WEIGHT: 182 LBS | BODY MASS INDEX: 34 KG/M2

## 2025-08-26 DIAGNOSIS — N64.4 BREAST PAIN: ICD-10-CM

## 2025-08-26 DIAGNOSIS — R23.4 BREAST SKIN CHANGES: Primary | ICD-10-CM

## 2025-08-26 DIAGNOSIS — N91.2 AMENORRHEA: ICD-10-CM

## 2025-08-26 DIAGNOSIS — R73.02 IMPAIRED GLUCOSE TOLERANCE: Primary | ICD-10-CM

## 2025-08-26 DIAGNOSIS — M54.6 THORACIC SPINE PAIN: ICD-10-CM

## 2025-08-26 DIAGNOSIS — R76.8 FALSE POSITIVE TEST FOR SYPHILIS: ICD-10-CM

## 2025-08-26 LAB
CONTROL LINE PRESENT WITH A CLEAR BACKGROUND (YES/NO): YES YES/NO
KIT LOT #: NORMAL NUMERIC
PREGNANCY TEST, URINE: NEGATIVE

## 2025-08-26 PROCEDURE — 81025 URINE PREGNANCY TEST: CPT | Performed by: FAMILY MEDICINE

## 2025-08-26 PROCEDURE — 99214 OFFICE O/P EST MOD 30 MIN: CPT | Performed by: FAMILY MEDICINE

## 2025-08-26 RX ORDER — FLUTICASONE PROPIONATE 50 MCG
2 SPRAY, SUSPENSION (ML) NASAL DAILY
COMMUNITY
Start: 2025-07-28